# Patient Record
Sex: FEMALE | Race: WHITE | NOT HISPANIC OR LATINO | Employment: OTHER | URBAN - METROPOLITAN AREA
[De-identification: names, ages, dates, MRNs, and addresses within clinical notes are randomized per-mention and may not be internally consistent; named-entity substitution may affect disease eponyms.]

---

## 2023-09-08 PROBLEM — M47.894 THORACIC FACET SYNDROME: Status: ACTIVE | Noted: 2023-09-08

## 2023-09-08 PROBLEM — D64.9 ANEMIA: Status: ACTIVE | Noted: 2023-09-08

## 2023-09-08 PROBLEM — S22.060A COMPRESSION FRACTURE OF T7 VERTEBRA (MULTI): Status: ACTIVE | Noted: 2023-09-08

## 2023-09-08 PROBLEM — K22.70 BARRETT ESOPHAGUS: Status: ACTIVE | Noted: 2023-09-08

## 2023-09-08 PROBLEM — M81.0 OSTEOPOROSIS, SENILE: Status: ACTIVE | Noted: 2023-09-08

## 2023-09-08 PROBLEM — I10 ESSENTIAL HYPERTENSION: Status: ACTIVE | Noted: 2023-09-08

## 2023-09-08 PROBLEM — K21.9 ESOPHAGEAL REFLUX: Status: ACTIVE | Noted: 2023-09-08

## 2023-09-08 PROBLEM — N18.30 CHRONIC KIDNEY DISEASE, STAGE 3 (MULTI): Status: ACTIVE | Noted: 2023-09-08

## 2023-09-08 PROBLEM — I25.10 ATHEROSCLEROTIC HEART DISEASE OF NATIVE CORONARY ARTERY WITHOUT ANGINA PECTORIS: Status: ACTIVE | Noted: 2023-09-08

## 2023-09-08 PROBLEM — S22.009A CLOSED FRACTURE OF THORACIC VERTEBRA (MULTI): Status: ACTIVE | Noted: 2023-09-08

## 2023-09-08 PROBLEM — N18.9 ANEMIA OF CHRONIC RENAL FAILURE: Status: ACTIVE | Noted: 2023-09-08

## 2023-09-08 PROBLEM — R79.9 ABNORMAL BLOOD CHEMISTRY: Status: ACTIVE | Noted: 2023-09-08

## 2023-09-08 PROBLEM — G89.29 OTHER CHRONIC PAIN: Status: ACTIVE | Noted: 2023-09-08

## 2023-09-08 PROBLEM — E78.5 HYPERLIPIDEMIA: Status: ACTIVE | Noted: 2023-09-08

## 2023-09-08 PROBLEM — E78.00 PURE HYPERCHOLESTEROLEMIA: Status: ACTIVE | Noted: 2023-09-08

## 2023-09-08 PROBLEM — E03.9 HYPOTHYROID: Status: ACTIVE | Noted: 2023-09-08

## 2023-09-08 PROBLEM — M54.14 THORACIC RADICULITIS: Status: ACTIVE | Noted: 2023-09-08

## 2023-09-08 PROBLEM — E78.5 DYSLIPIDEMIA: Status: ACTIVE | Noted: 2023-09-08

## 2023-09-08 PROBLEM — G89.29 CHRONIC BACK PAIN: Status: ACTIVE | Noted: 2023-09-08

## 2023-09-08 PROBLEM — M26.609 TEMPOROMANDIBULAR JOINT SYNDROME: Status: ACTIVE | Noted: 2023-09-08

## 2023-09-08 PROBLEM — D63.1 ANEMIA OF CHRONIC RENAL FAILURE: Status: ACTIVE | Noted: 2023-09-08

## 2023-09-08 PROBLEM — H26.9 CATARACT: Status: ACTIVE | Noted: 2023-09-08

## 2023-09-08 PROBLEM — S22.061A: Status: ACTIVE | Noted: 2023-09-08

## 2023-09-08 PROBLEM — E55.9 VITAMIN D DEFICIENCY: Status: ACTIVE | Noted: 2023-09-08

## 2023-09-08 PROBLEM — I82.90 VENOUS THROMBOSIS: Status: ACTIVE | Noted: 2023-09-08

## 2023-09-08 PROBLEM — M05.9 RHEUMATOID ARTHRITIS WITH RHEUMATOID FACTOR (MULTI): Status: ACTIVE | Noted: 2023-09-08

## 2023-09-08 PROBLEM — R41.82 ALTERED MENTAL STATUS: Status: ACTIVE | Noted: 2023-09-08

## 2023-09-08 PROBLEM — M54.9 CHRONIC BACK PAIN: Status: ACTIVE | Noted: 2023-09-08

## 2023-09-08 PROBLEM — M19.90 OSTEOARTHRITIS: Status: ACTIVE | Noted: 2023-09-08

## 2023-09-08 PROBLEM — I51.9 HEART DISEASE: Status: ACTIVE | Noted: 2023-09-08

## 2023-09-08 PROBLEM — G70.00 MYASTHENIA GRAVIS (MULTI): Status: ACTIVE | Noted: 2023-09-08

## 2023-09-08 PROBLEM — R50.9 PYREXIA OF UNKNOWN ORIGIN: Status: ACTIVE | Noted: 2023-09-08

## 2023-09-08 PROBLEM — A41.9 SEPSIS (MULTI): Status: ACTIVE | Noted: 2023-09-08

## 2023-09-08 PROBLEM — R07.9 CHEST PAIN: Status: ACTIVE | Noted: 2023-09-08

## 2023-09-08 PROBLEM — M54.16 LUMBAR RADICULITIS: Status: ACTIVE | Noted: 2023-09-08

## 2023-09-08 PROBLEM — I95.1 ORTHOSTATIC HYPOTENSION: Status: ACTIVE | Noted: 2023-09-08

## 2023-09-08 PROBLEM — N18.4 STAGE 4 CHRONIC KIDNEY DISEASE (MULTI): Status: ACTIVE | Noted: 2023-09-08

## 2023-09-08 PROBLEM — D50.9 IRON DEFICIENCY ANEMIA: Status: ACTIVE | Noted: 2023-09-08

## 2023-09-08 PROBLEM — G47.00 INSOMNIA: Status: ACTIVE | Noted: 2023-09-08

## 2023-09-08 PROBLEM — M76.899 ENTHESOPATHY OF HIP REGION: Status: ACTIVE | Noted: 2023-09-08

## 2023-09-08 PROBLEM — Z95.5 HISTORY OF CORONARY ARTERY STENT PLACEMENT: Status: ACTIVE | Noted: 2023-09-08

## 2023-09-08 RX ORDER — LANOLIN ALCOHOL/MO/W.PET/CERES
1 CREAM (GRAM) TOPICAL DAILY
COMMUNITY
Start: 2018-10-29

## 2023-09-08 RX ORDER — LEVOTHYROXINE SODIUM 75 UG/1
1 TABLET ORAL
COMMUNITY
Start: 2018-10-29 | End: 2024-01-11

## 2023-09-08 RX ORDER — ACETAMINOPHEN 500 MG
1 TABLET ORAL DAILY
COMMUNITY
End: 2024-01-29 | Stop reason: ALTCHOICE

## 2023-09-08 RX ORDER — FOLIC ACID 1 MG/1
1 TABLET ORAL EVERY MORNING
COMMUNITY
End: 2024-01-29 | Stop reason: ALTCHOICE

## 2023-09-08 RX ORDER — PANTOPRAZOLE SODIUM 40 MG/1
1 TABLET, DELAYED RELEASE ORAL DAILY
COMMUNITY
Start: 2017-08-08

## 2023-09-08 RX ORDER — PYRIDOSTIGMINE BROMIDE 60 MG/1
TABLET ORAL
COMMUNITY
Start: 2022-10-24 | End: 2024-01-29 | Stop reason: ALTCHOICE

## 2023-09-08 RX ORDER — LEVOTHYROXINE SODIUM 50 UG/1
1 TABLET ORAL
COMMUNITY
End: 2024-01-29 | Stop reason: ALTCHOICE

## 2023-09-08 RX ORDER — METOPROLOL TARTRATE 50 MG/1
1 TABLET ORAL
COMMUNITY

## 2023-09-08 RX ORDER — AMLODIPINE BESYLATE 10 MG/1
1 TABLET ORAL DAILY
COMMUNITY
End: 2024-01-29 | Stop reason: ALTCHOICE

## 2023-09-08 RX ORDER — ATORVASTATIN CALCIUM 80 MG/1
1 TABLET, FILM COATED ORAL DAILY
COMMUNITY
Start: 2016-06-15

## 2023-09-08 RX ORDER — DOCUSATE SODIUM 100 MG/1
12 CAPSULE, LIQUID FILLED ORAL 2 TIMES DAILY PRN
COMMUNITY
Start: 2016-08-19

## 2023-09-08 RX ORDER — PREDNISONE 5 MG/1
1 TABLET ORAL DAILY
COMMUNITY
End: 2024-01-29 | Stop reason: ALTCHOICE

## 2023-09-08 RX ORDER — DENOSUMAB 60 MG/ML
60 INJECTION SUBCUTANEOUS
COMMUNITY
Start: 2018-03-20

## 2023-09-08 RX ORDER — NITROGLYCERIN 0.4 MG/1
TABLET SUBLINGUAL
COMMUNITY
Start: 2016-06-01

## 2023-09-08 RX ORDER — SODIUM BICARBONATE 650 MG/1
1 TABLET ORAL 2 TIMES DAILY
COMMUNITY
Start: 2018-10-29 | End: 2024-06-10

## 2023-09-08 RX ORDER — SEVELAMER CARBONATE 800 MG/1
1 TABLET, FILM COATED ORAL
COMMUNITY
End: 2024-01-29 | Stop reason: ALTCHOICE

## 2023-09-08 RX ORDER — CALCIUM CITRATE/VITAMIN D3 200MG-6.25
1 TABLET ORAL DAILY
COMMUNITY
Start: 2015-02-19 | End: 2024-01-29 | Stop reason: ALTCHOICE

## 2023-09-08 RX ORDER — AMLODIPINE BESYLATE 2.5 MG/1
1 TABLET ORAL NIGHTLY
COMMUNITY
Start: 2017-05-09 | End: 2023-12-27

## 2023-09-08 RX ORDER — FERROUS SULFATE 325(65) MG
1 TABLET ORAL 3 TIMES WEEKLY
COMMUNITY
Start: 2018-12-21 | End: 2024-01-29 | Stop reason: ALTCHOICE

## 2023-09-08 RX ORDER — HYDROXYCHLOROQUINE SULFATE 200 MG/1
1 TABLET, FILM COATED ORAL 2 TIMES DAILY
COMMUNITY
Start: 2018-10-29 | End: 2024-02-05 | Stop reason: SDUPTHER

## 2023-09-08 RX ORDER — TRAZODONE HYDROCHLORIDE 50 MG/1
0.5 TABLET ORAL NIGHTLY PRN
COMMUNITY
Start: 2021-06-08 | End: 2024-01-29 | Stop reason: ALTCHOICE

## 2023-09-08 RX ORDER — OMEPRAZOLE 40 MG/1
1 CAPSULE, DELAYED RELEASE ORAL
COMMUNITY

## 2023-09-08 RX ORDER — ACETAMINOPHEN 325 MG/1
2 TABLET ORAL
COMMUNITY

## 2023-09-08 RX ORDER — TRAMADOL HYDROCHLORIDE 50 MG/1
1 TABLET ORAL DAILY PRN
COMMUNITY
Start: 2015-09-30 | End: 2024-02-05 | Stop reason: ALTCHOICE

## 2023-09-08 RX ORDER — DICLOFENAC EPOLAMINE 0.01 G/1
SYSTEM TOPICAL 2 TIMES DAILY
COMMUNITY
Start: 2020-06-04

## 2023-11-02 ENCOUNTER — HOSPITAL ENCOUNTER (OUTPATIENT)
Dept: RADIOLOGY | Facility: HOSPITAL | Age: 83
Discharge: HOME | End: 2023-11-02
Payer: MEDICARE

## 2023-11-02 VITALS — WEIGHT: 138 LBS | HEIGHT: 63 IN | BODY MASS INDEX: 24.45 KG/M2

## 2023-11-02 DIAGNOSIS — Z12.31 ENCOUNTER FOR SCREENING MAMMOGRAM FOR MALIGNANT NEOPLASM OF BREAST: ICD-10-CM

## 2023-11-02 PROCEDURE — 77067 SCR MAMMO BI INCL CAD: CPT

## 2023-11-02 PROCEDURE — 77063 BREAST TOMOSYNTHESIS BI: CPT

## 2023-11-17 ENCOUNTER — LAB (OUTPATIENT)
Dept: LAB | Facility: LAB | Age: 83
End: 2023-11-17
Payer: MEDICARE

## 2023-11-17 DIAGNOSIS — I25.10 ATHEROSCLEROTIC HEART DISEASE OF NATIVE CORONARY ARTERY WITHOUT ANGINA PECTORIS: ICD-10-CM

## 2023-11-17 DIAGNOSIS — N18.4 CHRONIC KIDNEY DISEASE, STAGE 4 (SEVERE) (MULTI): Primary | ICD-10-CM

## 2023-11-17 LAB
ALBUMIN SERPL-MCNC: 4.3 G/DL (ref 3.5–5)
ALP BLD-CCNC: 58 U/L (ref 35–125)
ALT SERPL-CCNC: 9 U/L (ref 5–40)
ANION GAP SERPL CALC-SCNC: 14 MMOL/L
AST SERPL-CCNC: 17 U/L (ref 5–40)
BILIRUB SERPL-MCNC: 0.5 MG/DL (ref 0.1–1.2)
BUN SERPL-MCNC: 37 MG/DL (ref 8–25)
CALCIUM SERPL-MCNC: 9.5 MG/DL (ref 8.5–10.4)
CHLORIDE SERPL-SCNC: 101 MMOL/L (ref 97–107)
CHOLEST SERPL-MCNC: 144 MG/DL (ref 133–200)
CHOLEST/HDLC SERPL: 2 {RATIO}
CO2 SERPL-SCNC: 25 MMOL/L (ref 24–31)
CREAT SERPL-MCNC: 2.2 MG/DL (ref 0.4–1.6)
ERYTHROCYTE [DISTWIDTH] IN BLOOD BY AUTOMATED COUNT: 13.1 % (ref 11.5–14.5)
GFR SERPL CREATININE-BSD FRML MDRD: 22 ML/MIN/1.73M*2
GLUCOSE SERPL-MCNC: 100 MG/DL (ref 65–99)
HCT VFR BLD AUTO: 34.8 % (ref 36–46)
HDLC SERPL-MCNC: 72 MG/DL
HGB BLD-MCNC: 11.5 G/DL (ref 12–16)
LDLC SERPL CALC-MCNC: 57 MG/DL (ref 65–130)
MCH RBC QN AUTO: 28.6 PG (ref 26–34)
MCHC RBC AUTO-ENTMCNC: 33 G/DL (ref 32–36)
MCV RBC AUTO: 87 FL (ref 80–100)
NRBC BLD-RTO: 0 /100 WBCS (ref 0–0)
PHOSPHATE SERPL-MCNC: 5.9 MG/DL (ref 2.5–4.5)
PLATELET # BLD AUTO: 286 X10*3/UL (ref 150–450)
POTASSIUM SERPL-SCNC: 4.7 MMOL/L (ref 3.4–5.1)
PROT SERPL-MCNC: 6.7 G/DL (ref 5.9–7.9)
RBC # BLD AUTO: 4.02 X10*6/UL (ref 4–5.2)
SODIUM SERPL-SCNC: 140 MMOL/L (ref 133–145)
TRIGL SERPL-MCNC: 77 MG/DL (ref 40–150)
WBC # BLD AUTO: 5.8 X10*3/UL (ref 4.4–11.3)

## 2023-11-17 PROCEDURE — 80061 LIPID PANEL: CPT

## 2023-11-17 PROCEDURE — 80053 COMPREHEN METABOLIC PANEL: CPT

## 2023-11-17 PROCEDURE — 85027 COMPLETE CBC AUTOMATED: CPT

## 2023-11-17 PROCEDURE — 84100 ASSAY OF PHOSPHORUS: CPT

## 2023-11-17 PROCEDURE — 83970 ASSAY OF PARATHORMONE: CPT

## 2023-11-17 PROCEDURE — 36415 COLL VENOUS BLD VENIPUNCTURE: CPT

## 2023-11-18 LAB — PTH-INTACT SERPL-MCNC: 88.9 PG/ML (ref 18.5–88)

## 2023-12-22 ENCOUNTER — TELEPHONE (OUTPATIENT)
Dept: PRIMARY CARE | Facility: CLINIC | Age: 83
End: 2023-12-22
Payer: MEDICARE

## 2023-12-22 DIAGNOSIS — H60.502 ACUTE OTITIS EXTERNA OF LEFT EAR, UNSPECIFIED TYPE: Primary | ICD-10-CM

## 2023-12-22 RX ORDER — NEOMYCIN SULFATE, POLYMYXIN B SULFATE, HYDROCORTISONE 3.5; 10000; 1 MG/ML; [USP'U]/ML; MG/ML
4 SOLUTION/ DROPS AURICULAR (OTIC) 4 TIMES DAILY
Qty: 10 ML | Refills: 0 | Status: SHIPPED | OUTPATIENT
Start: 2023-12-22 | End: 2024-01-01

## 2023-12-22 NOTE — TELEPHONE ENCOUNTER
Pt c/o left ear pain x2 days and asking if you could send Rx.  States she had this in the past and you Rxd polymyxin drop.  Asking if you could same.  Please advise.      [id number]

## 2023-12-27 DIAGNOSIS — I10 ESSENTIAL HYPERTENSION: ICD-10-CM

## 2023-12-27 RX ORDER — AMLODIPINE BESYLATE 2.5 MG/1
2.5 TABLET ORAL NIGHTLY
Qty: 90 TABLET | Refills: 3 | Status: SHIPPED | OUTPATIENT
Start: 2023-12-27

## 2024-01-11 ENCOUNTER — HOSPITAL ENCOUNTER (OUTPATIENT)
Dept: RADIOLOGY | Facility: HOSPITAL | Age: 84
Discharge: HOME | End: 2024-01-11
Payer: MEDICARE

## 2024-01-11 DIAGNOSIS — S83.231A COMPLEX TEAR OF MEDIAL MENISCUS, CURRENT INJURY, RIGHT KNEE, INITIAL ENCOUNTER: ICD-10-CM

## 2024-01-11 DIAGNOSIS — E03.9 HYPOTHYROIDISM, UNSPECIFIED: ICD-10-CM

## 2024-01-11 PROCEDURE — 73721 MRI JNT OF LWR EXTRE W/O DYE: CPT | Mod: RT

## 2024-01-11 RX ORDER — LEVOTHYROXINE SODIUM 75 UG/1
TABLET ORAL
Qty: 78 TABLET | Refills: 2 | Status: SHIPPED | OUTPATIENT
Start: 2024-01-11

## 2024-01-29 ENCOUNTER — LAB (OUTPATIENT)
Dept: LAB | Facility: LAB | Age: 84
End: 2024-01-29
Payer: MEDICARE

## 2024-01-29 ENCOUNTER — OFFICE VISIT (OUTPATIENT)
Dept: PRIMARY CARE | Facility: CLINIC | Age: 84
End: 2024-01-29
Payer: MEDICARE

## 2024-01-29 VITALS
HEIGHT: 63 IN | SYSTOLIC BLOOD PRESSURE: 102 MMHG | HEART RATE: 60 BPM | TEMPERATURE: 97.3 F | DIASTOLIC BLOOD PRESSURE: 60 MMHG | OXYGEN SATURATION: 100 % | BODY MASS INDEX: 24.45 KG/M2 | WEIGHT: 138 LBS

## 2024-01-29 DIAGNOSIS — E03.9 ACQUIRED HYPOTHYROIDISM: ICD-10-CM

## 2024-01-29 DIAGNOSIS — E55.9 VITAMIN D DEFICIENCY: ICD-10-CM

## 2024-01-29 DIAGNOSIS — R73.01 IMPAIRED FASTING BLOOD SUGAR: ICD-10-CM

## 2024-01-29 DIAGNOSIS — G14 POST-POLIO SYNDROME (CMS-HCC): ICD-10-CM

## 2024-01-29 DIAGNOSIS — M05.9 RHEUMATOID ARTHRITIS WITH POSITIVE RHEUMATOID FACTOR, INVOLVING UNSPECIFIED SITE (MULTI): ICD-10-CM

## 2024-01-29 DIAGNOSIS — N18.4 ANEMIA OF CHRONIC RENAL FAILURE, STAGE 4 (SEVERE) (MULTI): ICD-10-CM

## 2024-01-29 DIAGNOSIS — Z00.00 ROUTINE GENERAL MEDICAL EXAMINATION AT HEALTH CARE FACILITY: Primary | ICD-10-CM

## 2024-01-29 DIAGNOSIS — N18.4 CHRONIC RENAL DISEASE, STAGE IV (MULTI): ICD-10-CM

## 2024-01-29 DIAGNOSIS — I25.10 ATHEROSCLEROSIS OF NATIVE CORONARY ARTERY OF NATIVE HEART WITHOUT ANGINA PECTORIS: ICD-10-CM

## 2024-01-29 DIAGNOSIS — E78.00 PURE HYPERCHOLESTEROLEMIA: ICD-10-CM

## 2024-01-29 DIAGNOSIS — M26.609 TEMPOROMANDIBULAR JOINT SYNDROME: ICD-10-CM

## 2024-01-29 DIAGNOSIS — I12.9 BENIGN HYPERTENSIVE KIDNEY DISEASE WITH CHRONIC KIDNEY DISEASE STAGE I THROUGH STAGE IV, OR UNSPECIFIED(403.10): ICD-10-CM

## 2024-01-29 DIAGNOSIS — D63.1 ANEMIA OF CHRONIC RENAL FAILURE, STAGE 4 (SEVERE) (MULTI): ICD-10-CM

## 2024-01-29 PROBLEM — M25.579 ARTHRALGIA OF ANKLE: Status: ACTIVE | Noted: 2024-01-29

## 2024-01-29 PROBLEM — A80.9 ACUTE POLIOMYELITIS (HHS-HCC): Status: ACTIVE | Noted: 2024-01-29

## 2024-01-29 PROBLEM — J32.9 SINUSITIS: Status: ACTIVE | Noted: 2024-01-29

## 2024-01-29 PROBLEM — H90.3 SENSORINEURAL HEARING LOSS (SNHL) OF BOTH EARS: Status: ACTIVE | Noted: 2024-01-29

## 2024-01-29 PROBLEM — J30.2 SEASONAL ALLERGIC RHINITIS: Status: ACTIVE | Noted: 2024-01-29

## 2024-01-29 PROBLEM — K59.00 CONSTIPATION: Status: ACTIVE | Noted: 2024-01-29

## 2024-01-29 PROBLEM — H92.02 LEFT EAR PAIN: Status: ACTIVE | Noted: 2024-01-29

## 2024-01-29 PROBLEM — H61.20 IMPACTED CERUMEN: Status: ACTIVE | Noted: 2024-01-29

## 2024-01-29 PROBLEM — Z86.2 HISTORY OF IMMUNE DISORDER: Status: ACTIVE | Noted: 2024-01-29

## 2024-01-29 PROBLEM — S22.000A COMPRESSION FRACTURE OF THORACIC VERTEBRA (MULTI): Status: ACTIVE | Noted: 2017-08-31

## 2024-01-29 PROBLEM — M25.569 KNEE PAIN: Status: ACTIVE | Noted: 2023-07-10

## 2024-01-29 PROBLEM — R42 DIZZINESS AND GIDDINESS: Status: ACTIVE | Noted: 2024-01-29

## 2024-01-29 LAB
25(OH)D3 SERPL-MCNC: 30 NG/ML (ref 31–100)
ALBUMIN SERPL-MCNC: 4.3 G/DL (ref 3.5–5)
ALP BLD-CCNC: 62 U/L (ref 35–125)
ALT SERPL-CCNC: 5 U/L (ref 5–40)
ANION GAP SERPL CALC-SCNC: 11 MMOL/L
AST SERPL-CCNC: 16 U/L (ref 5–40)
BILIRUB SERPL-MCNC: 0.4 MG/DL (ref 0.1–1.2)
BUN SERPL-MCNC: 29 MG/DL (ref 8–25)
CALCIUM SERPL-MCNC: 8.9 MG/DL (ref 8.5–10.4)
CHLORIDE SERPL-SCNC: 105 MMOL/L (ref 97–107)
CO2 SERPL-SCNC: 25 MMOL/L (ref 24–31)
CREAT SERPL-MCNC: 2.1 MG/DL (ref 0.4–1.6)
EGFRCR SERPLBLD CKD-EPI 2021: 23 ML/MIN/1.73M*2
ERYTHROCYTE [DISTWIDTH] IN BLOOD BY AUTOMATED COUNT: 13.5 % (ref 11.5–14.5)
EST. AVERAGE GLUCOSE BLD GHB EST-MCNC: 88 MG/DL
GLUCOSE SERPL-MCNC: 63 MG/DL (ref 65–99)
HBA1C MFR BLD: 4.7 %
HCT VFR BLD AUTO: 33.8 % (ref 36–46)
HGB BLD-MCNC: 10.9 G/DL (ref 12–16)
IRON SATN MFR SERPL: 25 % (ref 12–50)
IRON SERPL-MCNC: 71 UG/DL (ref 30–160)
MCH RBC QN AUTO: 28.2 PG (ref 26–34)
MCHC RBC AUTO-ENTMCNC: 32.2 G/DL (ref 32–36)
MCV RBC AUTO: 88 FL (ref 80–100)
NRBC BLD-RTO: 0 /100 WBCS (ref 0–0)
PLATELET # BLD AUTO: 300 X10*3/UL (ref 150–450)
POTASSIUM SERPL-SCNC: 5.5 MMOL/L (ref 3.4–5.1)
PROT SERPL-MCNC: 6.5 G/DL (ref 5.9–7.9)
RBC # BLD AUTO: 3.86 X10*6/UL (ref 4–5.2)
SODIUM SERPL-SCNC: 141 MMOL/L (ref 133–145)
TIBC SERPL-MCNC: 289 UG/DL (ref 228–428)
TSH SERPL DL<=0.05 MIU/L-ACNC: 0.4 MIU/L (ref 0.27–4.2)
UIBC SERPL-MCNC: 218 UG/DL (ref 110–370)
VIT B12 SERPL-MCNC: >2000 PG/ML (ref 211–946)
WBC # BLD AUTO: 6.1 X10*3/UL (ref 4.4–11.3)

## 2024-01-29 PROCEDURE — 1158F ADVNC CARE PLAN TLK DOCD: CPT | Performed by: INTERNAL MEDICINE

## 2024-01-29 PROCEDURE — 1036F TOBACCO NON-USER: CPT | Performed by: INTERNAL MEDICINE

## 2024-01-29 PROCEDURE — 1125F AMNT PAIN NOTED PAIN PRSNT: CPT | Performed by: INTERNAL MEDICINE

## 2024-01-29 PROCEDURE — 1123F ACP DISCUSS/DSCN MKR DOCD: CPT | Performed by: INTERNAL MEDICINE

## 2024-01-29 PROCEDURE — 84443 ASSAY THYROID STIM HORMONE: CPT

## 2024-01-29 PROCEDURE — 82607 VITAMIN B-12: CPT

## 2024-01-29 PROCEDURE — 1159F MED LIST DOCD IN RCRD: CPT | Performed by: INTERNAL MEDICINE

## 2024-01-29 PROCEDURE — 83550 IRON BINDING TEST: CPT

## 2024-01-29 PROCEDURE — 36415 COLL VENOUS BLD VENIPUNCTURE: CPT

## 2024-01-29 PROCEDURE — 80053 COMPREHEN METABOLIC PANEL: CPT

## 2024-01-29 PROCEDURE — 83540 ASSAY OF IRON: CPT

## 2024-01-29 PROCEDURE — 3074F SYST BP LT 130 MM HG: CPT | Performed by: INTERNAL MEDICINE

## 2024-01-29 PROCEDURE — 85027 COMPLETE CBC AUTOMATED: CPT

## 2024-01-29 PROCEDURE — 3078F DIAST BP <80 MM HG: CPT | Performed by: INTERNAL MEDICINE

## 2024-01-29 PROCEDURE — G0439 PPPS, SUBSEQ VISIT: HCPCS | Performed by: INTERNAL MEDICINE

## 2024-01-29 PROCEDURE — 99215 OFFICE O/P EST HI 40 MIN: CPT | Mod: 27 | Performed by: INTERNAL MEDICINE

## 2024-01-29 PROCEDURE — 82306 VITAMIN D 25 HYDROXY: CPT

## 2024-01-29 PROCEDURE — 83036 HEMOGLOBIN GLYCOSYLATED A1C: CPT

## 2024-01-29 RX ORDER — SEVELAMER CARBONATE 800 MG/1
800 TABLET, FILM COATED ORAL 2 TIMES DAILY
COMMUNITY

## 2024-01-29 ASSESSMENT — ENCOUNTER SYMPTOMS
SHORTNESS OF BREATH: 0
PALPITATIONS: 0
DEPRESSION: 0
OCCASIONAL FEELINGS OF UNSTEADINESS: 0
LOSS OF SENSATION IN FEET: 0

## 2024-01-29 ASSESSMENT — PATIENT HEALTH QUESTIONNAIRE - PHQ9
SUM OF ALL RESPONSES TO PHQ9 QUESTIONS 1 AND 2: 0
1. LITTLE INTEREST OR PLEASURE IN DOING THINGS: NOT AT ALL
2. FEELING DOWN, DEPRESSED OR HOPELESS: NOT AT ALL
2. FEELING DOWN, DEPRESSED OR HOPELESS: NOT AT ALL
1. LITTLE INTEREST OR PLEASURE IN DOING THINGS: NOT AT ALL
SUM OF ALL RESPONSES TO PHQ9 QUESTIONS 1 AND 2: 0

## 2024-01-29 ASSESSMENT — PAIN SCALES - GENERAL: PAINLEVEL: 4

## 2024-01-29 NOTE — PATIENT INSTRUCTIONS
[employer], Living Will papers bring in next visit to scan in chart.      Tdap vaccine at local pharmacies, good idea.    Diagnoses and all orders for this visit:  Routine general medical examination at health care facility  Pure hypercholesterolemia  Rheumatoid arthritis with positive rheumatoid factor, involving unspecified site (CMS/[facility])  Comments:   follows, on Prolia, would recommend DEXA scan she can order.  Chronic renal disease, stage IV (CMS/[facility])  -     Comprehensive Metabolic Panel; Future  Anemia of chronic renal failure, stage 4 (severe) (CMS/[facility])  -     CBC; Future  -     Vitamin B12; Future  -     Iron and TIBC; Future  Post-polio syndrome  Atherosclerosis of native coronary artery of native heart without angina pectoris  Temporomandibular joint syndrome  Comments:  Left MTJ causing ear pain, ears looks good. Over the counter bite guard can help. Follow up with dentist if continues.  Acquired hypothyroidism  -     TSH with reflex to Free T4 if abnormal; Future  Vitamin D deficiency  -     Vitamin D 25-Hydroxy,Total (for eval of Vitamin D levels); Future  Impaired fasting blood sugar  -     Hemoglobin A1C; Future  Benign hypertensive kidney disease with chronic kidney disease stage I through stage IV, or unspecified(403.10)  Comments:  Some fatigue, low BP, let's stop amlodipine. low dose

## 2024-01-29 NOTE — PROGRESS NOTES
[facility]: [facility]  MEDICARE WELLNESS EXAM      Kena Bolaños is a 83 y.o. female that is presenting today for Annual Exam (Hair loss, fatigue/).    Assessment/Plan    Diagnoses and all orders for this visit:  Routine general medical examination at [facility] care facility  Pure hypercholesterolemia  Rheumatoid arthritis with positive rheumatoid factor, involving unspecified site (CMS/[facility])  Comments:   follows, on Prolia, would recommend DEXA scan she can order.  Chronic renal disease, stage IV (CMS/[facility])  -     Comprehensive Metabolic Panel; Future  Anemia of chronic renal failure, stage 4 (severe) (CMS/[facility])  -     CBC; Future  -     Vitamin B12; Future  -     Iron and TIBC; Future  Post-polio syndrome  Atherosclerosis of native coronary artery of native heart without angina pectoris  Temporomandibular joint syndrome  Comments:  Left MTJ causing ear pain, ears looks good. Over the counter bite guard can help. Follow up with dentist if continues.  Acquired hypothyroidism  -     TSH with reflex to Free T4 if abnormal; Future  Vitamin D deficiency  -     Vitamin D 25-Hydroxy,Total (for eval of Vitamin D levels); Future  Impaired fasting blood sugar  -     Hemoglobin A1C; Future  Benign hypertensive kidney disease with chronic kidney disease stage I through stage IV, or unspecified(403.10)  Comments:  Some fatigue, low BP, let's stop amlodipine. low dose  Other orders  -     Follow Up In Primary Care - Established; Future    ADVANCED CARE PLANNING  Advanced Care Planning was discussed with patient:  The patient does not have an advanced care plan on file. The patient does not have an active surrogate decision-maker on file.  Encouraged the patient to confirm that Living Will and Healthcare Power of  (HCPoA) are accurate and up to date.  Encouraged the patient to confirm that our office be provided a copy of any documentation in the event that anything changes.    ACTIVITIES OF  DAILY LIVING  Basic ADLs:  Bathing: Independent, Dressing: Independent, Toileting: Independent, Transferring: Independent, Continence: Independent, Feeding: Independent.    Instrumental ADLs:  Ability to use phone: Independent, Shopping: Independent, Cooking: Independent, House-keeping: Independent, Laundry: Independent, Transportation: Independent, Medication Management: Independent, Finance Management: Independent  .    Subjective   Wellness visit. Over all health status doing well. Diet reviewed, Mediterranean, low sugar diet suggested. No  active depression, or little interest in doing activites or hopelessness. Home safety reviewed, well light, no throw rugs,etc. No falls. Advanced directives filled out at home.  ADL, and instrumental ADL no limits doing well. Vision screen eye exam yearly, hearing screen 6 ft whisper test normal.  No cognitive decline observed. Screening sheet given. Fatigue, BP on low end Echo mild apical hypokinesis . Stop Amlodipine low dose.    Review of Systems   Respiratory:  Negative for shortness of breath.    Cardiovascular:  Negative for chest pain and palpitations.   All other systems reviewed and are negative.    Objective   Vitals:    01/29/24 1127   BP: 102/60   Pulse: 60   Temp: 36.3 °C (97.3 °F)   SpO2: 100%      Body mass index is 24.45 kg/m².  Physical Exam  Constitutional:       General: She is not in acute distress.     Appearance: She is not toxic-appearing.   HENT:      Head: Normocephalic and atraumatic.      Right Ear: Tympanic membrane and ear canal normal.      Left Ear: Tympanic membrane and ear canal normal.      Nose: Nose normal.      Mouth/Throat:      Mouth: Mucous membranes are moist.      Pharynx: Oropharynx is clear.   Eyes:      Extraocular Movements: Extraocular movements intact.      Conjunctiva/sclera: Conjunctivae normal.      Pupils: Pupils are equal, round, and reactive to light.   Cardiovascular:      Rate and Rhythm: Normal rate and regular  "rhythm.      Heart sounds: Normal heart sounds. No murmur heard.  Pulmonary:      Breath sounds: Normal breath sounds.   Abdominal:      General: Bowel sounds are normal. There is no distension.      Palpations: Abdomen is soft. There is no mass.      Tenderness: There is no abdominal tenderness.   Musculoskeletal:         General: No swelling or tenderness. Normal range of motion.      Cervical back: Neck supple. No tenderness.      Right lower leg: No edema.      Left lower leg: No edema.   Skin:     General: Skin is warm and dry.   Neurological:      General: No focal deficit present.      Mental Status: She is oriented to person, place, and time.      Sensory: No sensory deficit.      Motor: No weakness.      Deep Tendon Reflexes: Reflexes normal.     Diagnostic Results   Lab Results   Component Value Date    GLUCOSE 100 (H) 11/17/2023    CALCIUM 9.5 11/17/2023     11/17/2023    K 4.7 11/17/2023    CO2 25 11/17/2023     11/17/2023    BUN 37 (H) 11/17/2023    CREATININE 2.20 (H) 11/17/2023     Lab Results   Component Value Date    ALT 9 11/17/2023    AST 17 11/17/2023    ALKPHOS 58 11/17/2023    BILITOT 0.5 11/17/2023     Lab Results   Component Value Date    WBC 5.8 11/17/2023    HGB 11.5 (L) 11/17/2023    HCT 34.8 (L) 11/17/2023    MCV 87 11/17/2023     11/17/2023     Lab Results   Component Value Date    CHOL 144 11/17/2023    CHOL 126 (L) 01/03/2023    CHOL 126 (L) 06/03/2022     Lab Results   Component Value Date    HDL 72.0 11/17/2023    HDL 68 01/03/2023    HDL 64 06/03/2022     Lab Results   Component Value Date    LDLCALC 57 (L) 11/17/2023    LDLCALC 41 (L) 01/03/2023    LDLCALC 47 (L) 06/03/2022     Lab Results   Component Value Date    TRIG 77 11/17/2023    TRIG 86 01/03/2023    TRIG 75 06/03/2022     No components found for: \"CHOLHDL\"  No results found for: \"HGBA1C\"  Other labs not included in the list above reviewed either before or during this encounter.    History   Past Medical " History:   Diagnosis Date   • Acute poliomyelitis, unspecified 2015    Polio   • Atherosclerotic heart disease of native coronary artery without angina pectoris 2023    Echo EF 50-55 Apical hypokinesis  ./   • Raygoza's esophagus without dysplasia 2015    Raygoza's esophagus   • Chronic renal disease, stage IV (CMS/[facility]) 2023       • Dry mouth    • Post-polio syndrome 2024    Right foot   • Rheumatoid arthritis with rheumatoid factor (CMS/[facility]) 2023    Dr. Dena Soto   • Temporomandibular joint syndrome 2023     Past Surgical History:   Procedure Laterality Date   • APPENDECTOMY     •  SECTION, CLASSIC  2015     Section   • CHOLECYSTECTOMY  2015    Cholecystectomy   • CT GUIDED IMAGING FOR NEEDLE PLACEMENT  2018    CT GUIDED IMAGING FOR NEEDLE PLACEMENT [facility]   • FOOT SURGERY  2015    Foot Surgery right '16 screw Dr.Dillar Rosado     Family History   Problem Relation Name Age of Onset   • Diabetes Mother     • Colon cancer Mother     • Diabetes Maternal Grandmother     • Diabetes Maternal Grandfather     • Pancreatic cancer Sibling       Social History     Socioeconomic History   • Marital status:      Spouse name: Not on file   • Number of children: Not on file   • Years of education: Not on file   • Highest education level: Not on file   Occupational History   • Not on file   Tobacco Use   • Smoking status: Never     Passive exposure: Never   • Smokeless tobacco: Never   Vaping Use   • Vaping Use: Never used   Substance and Sexual Activity   • Alcohol use: Yes   • Drug use: Never   • Sexual activity: Not on file   Other Topics Concern   • Not on file   Social History Narrative   • Not on file     Social Determinants of Health     Financial Resource Strain: Not on file   Food Insecurity: Not on file   Transportation Needs: Not on file   Physical Activity: Not on file   Stress: Not on file    Social Connections: Not on file   Intimate Partner Violence: Not on file   Housing Stability: Not on file     Allergies   Allergen Reactions   • Penicillins Hives   • Sulfa (Sulfonamide Antibiotics) Hives   • Losartan Potassium Other     Renal insuff   • Bee Venom Protein (Honey Bee) Rash     Current Outpatient Medications on File Prior to Visit   Medication Sig Dispense Refill   • acetaminophen (TylenoL) 325 mg tablet Take 2 tablets (650 mg) by mouth. 2-3 times a day     • amLODIPine (Norvasc) 2.5 mg tablet TAKE 1 TABLET BY MOUTH EVERY DAY AT NIGHT 90 tablet 3   • atorvastatin (Lipitor) 80 mg tablet Take 1 tablet (80 mg) by mouth once daily.     • cyanocobalamin (Vitamin B-12) 1,000 mcg tablet Take 1 tablet (1,000 mcg) by mouth once daily. As directed     • denosumab (Prolia) 60 mg/mL syringe Inject 1 mL (60 mg) under the skin every 6 months.     • diclofenac epolamine 1.3 % patch Place on the skin twice a day. Affected area     • docusate sodium (Colace) 100 mg capsule Take 12 capsules (1,200 mg) by mouth 2 times a day as needed.     • hydroxychloroquine (Plaquenil) 200 mg tablet Take 1 tablet (200 mg) by mouth 2 times a day.     • levothyroxine (Synthroid, Levoxyl) 75 mcg tablet TAKE 1 TABLET BY MOUTH IN THE MORNING ON AN EMPTY STOMACH MONDAY-SATURDAY 78 tablet 2   • methyl salicylate/menth/camph (SALONPAS TOP) Place on the skin 2 times a week.     • metoprolol tartrate (Lopressor) 50 mg tablet Take 1 tablet by mouth 2 times a day with meals.     • neomycin/polymyxin B/hydrocort (CORTISPORIN OTIC) 4 drops 2 times a day. Affected ear     • nitroglycerin (Nitrostat) 0.4 mg SL tablet Place under the tongue. PLACE 1 TABLET UNDER THE TONGUE EVERY 5 MINUTES FOR UP TO 3 DOSES AS NEEDED FOR CHEST PAIN.CALL 911 IF PAIN PERSISTS.     • omeprazole (PriLOSEC) 40 mg DR capsule Take 1 capsule (40 mg) by mouth once daily in the evening. Take with meals. gerd     • pantoprazole (ProtoNix) 40 mg EC tablet Take 1 tablet (40 mg)  by mouth once daily.     • POLYETHYLENE GLYCOL 3350 ORAL Take 17 g by mouth once daily.     • sevelamer carbonate (Renvela) 800 mg tablet Take 1 tablet (800 mg) by mouth 2 times a day. Swallow tablet whole; do not crush, break, or chew.     • sodium bicarbonate 650 mg tablet Take 1 tablet (650 mg) by mouth 2 times a day.     • tetrahydroz/dext 70/peg 400/pv (EYE DROPS ADVANCED RELIEF OPHT) Eye Drops Relief     • traMADol (Ultram) 50 mg tablet Take 1 tablet (50 mg) by mouth once daily as needed for severe pain (7 - 10).     • [DISCONTINUED] amLODIPine (Norvasc) 10 mg tablet Take 1 tablet (10 mg) by mouth once daily.     • [DISCONTINUED] kyle cit-mag-D3-Zn--man-bor (Citracal-D3 Plus Magnesium) 250- mg-mg-unit tablet Take 1 tablet by mouth once daily.     • [DISCONTINUED] cholecalciferol (Vitamin D-3) 50 mcg (2,000 unit) capsule Take 1 capsule (50 mcg) by mouth early in the morning..     • [DISCONTINUED] ferrous sulfate 325 (65 Fe) MG tablet Take 1 tablet (325 mg) by mouth 3 times a week.     • [DISCONTINUED] folic acid (Folvite) 1 mg tablet Take 1 tablet (1 mg) by mouth once daily in the morning.     • [DISCONTINUED] hydroxychloroquine sulfate (HYDROXYCHLOROQUINE ORAL) Take 2 tablets by mouth once daily. With food or milk     • [DISCONTINUED] levothyroxine (Synthroid, Levoxyl) 50 mcg tablet Take 1 tablet (50 mcg) by mouth once daily in the morning. Take before meals. 30 min before eating     • [DISCONTINUED] predniSONE (Deltasone) 5 mg tablet Take 1 tablet (5 mg) by mouth once daily.     • [DISCONTINUED] pyridostigmine (Mestinon) 60 mg tablet      • [DISCONTINUED] sevelamer carbonate (Renvela) 800 mg tablet 1 tablet (800 mg) 2 times a day with meals.     • [DISCONTINUED] tetrahydrozoline 0.05 % ophthalmic solution 1 drop 4 times a day as needed. Affected eye     • [DISCONTINUED] traZODone (Desyrel) 50 mg tablet Take 0.5 tablets (25 mg) by mouth as needed at bedtime.       No current facility-administered  medications on file prior to visit.     Immunization History   Administered Date(s) Administered   • Flu vaccine, quadrivalent, high-dose, preservative free, age 65y+ (FLUZONE) 09/21/2021   • Influenza, High Dose Seasonal, Preservative Free 09/26/2017, 09/21/2018   • Influenza, injectable, quadrivalent 10/20/2016, 10/22/2018, 10/13/2020   • Influenza, seasonal, injectable 10/04/2010, 10/25/2011, 10/08/2012, 09/05/2016   • Moderna COVID-19 vaccine, Fall 2023, 12 yeasrs and older (50mcg/0.5mL) 10/02/2023   • Moderna SARS-CoV-2 Vaccination 02/08/2021, 03/05/2021   • Pfizer Gray Cap SARS-CoV-2 07/21/2022   • Pfizer Purple Cap SARS-CoV-2 11/09/2021   • Pneumococcal conjugate vaccine, 13-valent (PREVNAR 13) 07/02/2015   • Pneumococcal polysaccharide vaccine, 23-valent, age 2 years and older (PNEUMOVAX 23) 01/01/2000, 11/30/2011   • Tdap vaccine, age 7 year and older (BOOSTRIX, ADACEL) 06/06/2013   • Zoster vaccine, recombinant, adult (SHINGRIX) 02/01/2019, 06/03/2019     Patient's medical history was reviewed and updated either before or during this encounter.     Donal Dewey MD

## 2024-01-30 ENCOUNTER — TELEPHONE (OUTPATIENT)
Dept: PRIMARY CARE | Facility: CLINIC | Age: 84
End: 2024-01-30
Payer: MEDICARE

## 2024-01-30 NOTE — TELEPHONE ENCOUNTER
Call pt let her know she will need to talk with  office Potassium increase may need additional medication to drive down potassium levels. K 5.5, resin TX? Pt aware

## 2024-02-05 ENCOUNTER — OFFICE VISIT (OUTPATIENT)
Dept: RHEUMATOLOGY | Facility: CLINIC | Age: 84
End: 2024-02-05
Payer: MEDICARE

## 2024-02-05 VITALS — BODY MASS INDEX: 24.27 KG/M2 | DIASTOLIC BLOOD PRESSURE: 70 MMHG | WEIGHT: 137 LBS | SYSTOLIC BLOOD PRESSURE: 126 MMHG

## 2024-02-05 DIAGNOSIS — M81.0 OSTEOPOROSIS, SENILE: ICD-10-CM

## 2024-02-05 DIAGNOSIS — M19.90 OSTEOARTHRITIS, UNSPECIFIED OSTEOARTHRITIS TYPE, UNSPECIFIED SITE: ICD-10-CM

## 2024-02-05 DIAGNOSIS — M05.9 RHEUMATOID ARTHRITIS WITH POSITIVE RHEUMATOID FACTOR, INVOLVING UNSPECIFIED SITE (MULTI): Primary | ICD-10-CM

## 2024-02-05 PROCEDURE — 1125F AMNT PAIN NOTED PAIN PRSNT: CPT | Performed by: INTERNAL MEDICINE

## 2024-02-05 PROCEDURE — 99214 OFFICE O/P EST MOD 30 MIN: CPT | Performed by: INTERNAL MEDICINE

## 2024-02-05 PROCEDURE — 1159F MED LIST DOCD IN RCRD: CPT | Performed by: INTERNAL MEDICINE

## 2024-02-05 PROCEDURE — 3078F DIAST BP <80 MM HG: CPT | Performed by: INTERNAL MEDICINE

## 2024-02-05 PROCEDURE — 3074F SYST BP LT 130 MM HG: CPT | Performed by: INTERNAL MEDICINE

## 2024-02-05 PROCEDURE — 1036F TOBACCO NON-USER: CPT | Performed by: INTERNAL MEDICINE

## 2024-02-05 RX ORDER — ALBUTEROL SULFATE 0.83 MG/ML
3 SOLUTION RESPIRATORY (INHALATION) AS NEEDED
Status: CANCELLED | OUTPATIENT
Start: 2024-02-12

## 2024-02-05 RX ORDER — FAMOTIDINE 10 MG/ML
20 INJECTION INTRAVENOUS ONCE AS NEEDED
Status: CANCELLED | OUTPATIENT
Start: 2024-02-12

## 2024-02-05 RX ORDER — HYDROXYCHLOROQUINE SULFATE 200 MG/1
200 TABLET, FILM COATED ORAL DAILY
Qty: 90 TABLET | Refills: 1 | Status: SHIPPED | OUTPATIENT
Start: 2024-02-05 | End: 2024-04-30 | Stop reason: SDUPTHER

## 2024-02-05 RX ORDER — DIPHENHYDRAMINE HYDROCHLORIDE 50 MG/ML
50 INJECTION INTRAMUSCULAR; INTRAVENOUS AS NEEDED
Status: CANCELLED | OUTPATIENT
Start: 2024-02-12

## 2024-02-05 RX ORDER — EPINEPHRINE 0.3 MG/.3ML
0.3 INJECTION SUBCUTANEOUS EVERY 5 MIN PRN
Status: CANCELLED | OUTPATIENT
Start: 2024-02-12

## 2024-02-05 NOTE — PROGRESS NOTES
"Recheck  OA  /  RA  /  OP  ( prolia next week - [place] )  Doing well.  Abnormal labs recently.       HPI - She saw Dr. Hernandez about R knee pain and had MRI last mo.  He gave her injection?when \"probably 3-4 months ago\" and she said he didn't mention one this time.  Injections only last 1 mo.  Tylenol helps.  Pain is worst at night.  No other pain.  No swelling.   ?AM stiffness \"my hands hurt\"  No CP, resp, or GI.  Sees eye dr. BROWN  NAD  RRR no r/m/g  CTA  No edema  No synovitis  R knee pain with ROM    A/P - OA and RA - stable  Decr hcq to 1 tab daily  Knee pain - ortho following.    OP - prolia is expensive.  She has appt for next week.  Will see if there is any pt assistance available.  Consider calcitonin nasal spray if she is no longer able to afford it  Reviewed recent labs - [facility]  Reeval 6 mo or sooner PRN      "

## 2024-02-09 ENCOUNTER — DOCUMENTATION (OUTPATIENT)
Dept: INFUSION THERAPY | Facility: CLINIC | Age: 84
End: 2024-02-09
Payer: MEDICARE

## 2024-02-09 NOTE — PROGRESS NOTES
Patient to be scheduled for Continuation of Prolia injections.  For Diagnosis: Osteoporosis    Labs..  Calcium drawn on: 9/5/2024 Ca+ 9.4  Lab Results   Component Value Date    CALCIUM 8.9 01/29/2024    PHOS 5.9 (H) 11/17/2023      Lab Results   Component Value Date    CAION 1.10 07/14/2023   Creatinine clearance 18.15 ml/min with Sept 2024 labs- OK per Dr. Fernandes to give injection  (>8.6mg/dl or ionized calcium WNL.  Hold / Contact provider if <8.6) (must be within 28 days of scheduled injection)    Calcium and Vitamin D supplement on medication list? No    Current Outpatient Medications:     acetaminophen (TylenoL) 325 mg tablet, Take 2 tablets (650 mg) by mouth. 2-3 times a day, Disp: , Rfl:     amLODIPine (Norvasc) 2.5 mg tablet, TAKE 1 TABLET BY MOUTH EVERY DAY AT NIGHT, Disp: 90 tablet, Rfl: 3    atorvastatin (Lipitor) 80 mg tablet, Take 1 tablet (80 mg) by mouth once daily., Disp: , Rfl:     cyanocobalamin (Vitamin B-12) 1,000 mcg tablet, Take 1 tablet (1,000 mcg) by mouth once daily. As directed, Disp: , Rfl:     denosumab (Prolia) 60 mg/mL syringe, Inject 1 mL (60 mg) under the skin every 6 months., Disp: , Rfl:     diclofenac epolamine 1.3 % patch, Place on the skin twice a day. Affected area, Disp: , Rfl:     docusate sodium (Colace) 100 mg capsule, Take 12 capsules (1,200 mg) by mouth 2 times a day as needed., Disp: , Rfl:     hydroxychloroquine (Plaquenil) 200 mg tablet, Take 1 tablet (200 mg) by mouth once daily., Disp: 90 tablet, Rfl: 1    levothyroxine (Synthroid, Levoxyl) 75 mcg tablet, TAKE 1 TABLET BY MOUTH IN THE MORNING ON AN EMPTY STOMACH MONDAY-SATURDAY, Disp: 78 tablet, Rfl: 2    methyl salicylate/menth/camph (SALONPAS TOP), Place on the skin 2 times a week., Disp: , Rfl:     metoprolol tartrate (Lopressor) 50 mg tablet, Take 1 tablet by mouth 2 times a day with meals., Disp: , Rfl:     nitroglycerin (Nitrostat) 0.4 mg SL tablet, Place under the tongue. PLACE 1 TABLET UNDER THE TONGUE EVERY  5 MINUTES FOR UP TO 3 DOSES AS NEEDED FOR CHEST PAIN.CALL 911 IF PAIN PERSISTS., Disp: , Rfl:     omeprazole (PriLOSEC) 40 mg DR capsule, Take 1 capsule (40 mg) by mouth once daily in the evening. Take with meals. gerd, Disp: , Rfl:     pantoprazole (ProtoNix) 40 mg EC tablet, Take 1 tablet (40 mg) by mouth once daily., Disp: , Rfl:     POLYETHYLENE GLYCOL 3350 ORAL, Take 17 g by mouth once daily., Disp: , Rfl:     sevelamer carbonate (Renvela) 800 mg tablet, Take 1 tablet (800 mg) by mouth 2 times a day. Swallow tablet whole; do not crush, break, or chew., Disp: , Rfl:     sodium bicarbonate 650 mg tablet, Take 1 tablet (650 mg) by mouth 2 times a day., Disp: , Rfl:     tetrahydroz/dext 70/peg 400/pv (EYE DROPS ADVANCED RELIEF OPHT), Eye Drops Relief, Disp: , Rfl:    (if no nurse to encourage discussion of supplementation at visit)    No obvious recent dental work per chart review. Nurse to confirm no dental within past/next 4 weeks at encounter.  Urine Hcg test ordered?Not applicable (If female pt <60 years of age and with reproductive capability)  (If urine Hcg test ordered please instruct pt upon scheduling to drink 32 ounces of water 1 hour before arrival so bladder is full for needed urine sample)       Scheduled 09/24/24  Meets criteria for prolia. Dr. Fernandes aware of creatinine clearance and states we can administer prolia at this time.

## 2024-02-13 ENCOUNTER — APPOINTMENT (OUTPATIENT)
Dept: INFUSION THERAPY | Facility: CLINIC | Age: 84
End: 2024-02-13
Payer: MEDICARE

## 2024-02-21 ENCOUNTER — LAB (OUTPATIENT)
Dept: LAB | Facility: LAB | Age: 84
End: 2024-02-21
Payer: MEDICARE

## 2024-02-21 DIAGNOSIS — M81.0 AGE-RELATED OSTEOPOROSIS WITHOUT CURRENT PATHOLOGICAL FRACTURE: Primary | ICD-10-CM

## 2024-02-21 LAB
CA-I BLD-SCNC: 1.14 MMOL/L (ref 1.1–1.33)
CREAT SERPL-MCNC: 2.1 MG/DL (ref 0.4–1.6)
EGFRCR SERPLBLD CKD-EPI 2021: 23 ML/MIN/1.73M*2

## 2024-02-21 PROCEDURE — 82330 ASSAY OF CALCIUM: CPT

## 2024-02-21 PROCEDURE — 36415 COLL VENOUS BLD VENIPUNCTURE: CPT

## 2024-02-21 PROCEDURE — 82565 ASSAY OF CREATININE: CPT

## 2024-02-26 DIAGNOSIS — M81.0 OSTEOPOROSIS, SENILE: Primary | ICD-10-CM

## 2024-02-27 ENCOUNTER — INFUSION (OUTPATIENT)
Dept: INFUSION THERAPY | Facility: CLINIC | Age: 84
End: 2024-02-27
Payer: MEDICARE

## 2024-02-27 VITALS
TEMPERATURE: 97.5 F | BODY MASS INDEX: 24.45 KG/M2 | RESPIRATION RATE: 16 BRPM | HEART RATE: 82 BPM | OXYGEN SATURATION: 99 % | DIASTOLIC BLOOD PRESSURE: 77 MMHG | SYSTOLIC BLOOD PRESSURE: 172 MMHG | WEIGHT: 138 LBS

## 2024-02-27 DIAGNOSIS — M81.0 OSTEOPOROSIS, SENILE: ICD-10-CM

## 2024-02-27 PROCEDURE — 96372 THER/PROPH/DIAG INJ SC/IM: CPT | Performed by: NURSE PRACTITIONER

## 2024-02-27 RX ORDER — DIPHENHYDRAMINE HYDROCHLORIDE 50 MG/ML
50 INJECTION INTRAMUSCULAR; INTRAVENOUS AS NEEDED
OUTPATIENT
Start: 2024-08-25

## 2024-02-27 RX ORDER — FAMOTIDINE 10 MG/ML
20 INJECTION INTRAVENOUS ONCE AS NEEDED
OUTPATIENT
Start: 2024-08-25

## 2024-02-27 RX ORDER — ALBUTEROL SULFATE 0.83 MG/ML
3 SOLUTION RESPIRATORY (INHALATION) AS NEEDED
OUTPATIENT
Start: 2024-08-25

## 2024-02-27 RX ORDER — EPINEPHRINE 0.3 MG/.3ML
0.3 INJECTION SUBCUTANEOUS EVERY 5 MIN PRN
OUTPATIENT
Start: 2024-08-25

## 2024-02-27 ASSESSMENT — PAIN SCALES - GENERAL: PAINLEVEL: 0-NO PAIN

## 2024-02-27 NOTE — PROGRESS NOTES
[facility]   infusion Clinic Note   Date: 2024   Name: Kena Bolaños  : 1940   MRN: [id number]         Reason for Visit: Injections (Prolia)         Visit Type: INJECTION       Ordered By: Dr Fernandes      Accompanied by:Self      Diagnosis: Osteoporosis, senile       Allergies:   Allergies as of 2024 - Reviewed 2024   Allergen Reaction Noted    Penicillins Hives 2023    Sulfa (sulfonamide antibiotics) Hives 2023    Losartan potassium Other 2023    Bee venom protein (honey bee) Rash 2023         Current Medications has a current medication list which includes the following prescription(s): acetaminophen, amlodipine, atorvastatin, cyanocobalamin, prolia, denosumab, diclofenac epolamine, docusate sodium, hydroxychloroquine, levothyroxine, methyl salicylate/menth/camph, metoprolol tartrate, nitroglycerin, omeprazole, pantoprazole, polyethylene glycol 3350, sevelamer carbonate, sodium bicarbonate, and tetrahydroz/dext 70/peg 400/pv.       Vitals:   Vitals:    24 1142   BP: 172/77   Pulse: 82   Resp: 16   Temp: 36.4 °C (97.5 °F)   TempSrc: Temporal   SpO2: 99%   Weight: 62.6 kg (138 lb)   PainSc: 0-No pain             Infusion Pre-procedure Checklist:   - Allergies reviewed: yes   - Medications reviewed: yes       - Previous reaction to current treatment: no      Assess patient for the concerns below. Document provider notification as appropriate.  - Active or recent infection with/without current antibiotic use: no  - Recent or planned invasive dental work: no  - Recent or planned surgeries: no  - Recently received or plans to receive vaccinations: no  - Has treatment related toxicities: no  - Is pregnant:  n/a      Pain: 0   - Is the pain different from normal: n/a   - Is the pain tolerable: n/a   - Is your Doctor aware:  n/a      Labs: N/A         Fall Risk Screening: John Fall Risk  History of Falling, Immediate or Within 3 Months:  No  Secondary Diagnosis: No  Ambulatory Aid: Walks without aid/bedrest/nurse assist  Intravenous Therapy/Heparin Lock: No  Gait/Transferring: Normal/bedrest/immobile  Mental Status: Oriented to own ability  Lopez Fall Risk Score: 0       Review Of Systems:  Review of Systems   All other systems reviewed and are negative.        Infusion Readiness:   - Assessment Concerns Related to Infusion: No  - Provider notified: n/a      Document Below Only If Indicated:   New Patient Education:    N/A (returning patient for continuation of therapy. Ongoing education provided as needed.)        Treatment Conditions & Drug Specific Questions:    Denosumab  (PROLIA. XGEVA)    (Unless otherwise specified on patient specific therapy plan):     TREATMENT CONDITIONS:  Unless otherwise specified on patient specific therapy plan HOLD and notify provider prior to proceeding with today's injection if patients:  o Calcium is LESS THAN 8.6 mg/dL OR  Ionized Calcium LESS THAN 1.1 mmol/L  o Recent or planned invasive dental procedure (within 4 weeks)    Lab Results   Component Value Date    CALCIUM 8.9 01/29/2024    PHOS 5.9 (H) 11/17/2023      Lab Results   Component Value Date    CAION 1.14 02/21/2024       Labs reviewed and patient meets treatment conditions? Yes, per Dr Dena HARRIS to treat with current lab values    DRUG SPECIFIC QUESTIONS:  Is the patient taking calcium and vitamin D? Yes  (Recommended)    Pt Instructed on following risks: (1) hypocalcemia, (2) osteonecrosis of the jaw, (3) atypical femoral fractures, (4) serious infections, and (5) dermatologic reactions?  Yes      REMINDER:  PREGNANCY CATEGORY X DRUG. OBTAIN NEGTATIVE PREGNANCY TEST PRIOR TO FIRST INFUSION FOR WOMEN OF CHILDBEARING ABILITY   REMS DRUG    Recommended Vitals/Observation:  Vitals: Obtain vitals prior to injection.  Observation: Patient may leave immediately following injection.        Weight Based Drug Calculations:    WEIGHT BASED DRUGS: NOT APPLICABLE /  FLAT DOSE          Initiated By: May Dacosta RN   Time: 12:11 PM     We administered denosumab.     **Documentation taking place after administration/visit due to connectivity issues**

## 2024-02-27 NOTE — PATIENT INSTRUCTIONS
Today :We administered denosumab.     For:   1. Osteoporosis, senile         Your next appointment is due in:  6 months        Please read the  Medication Guide that was given to you and reviewed during todays visit.     (Tell all doctors including dentists that you are taking this medication)     Go to the emergency room or call 911 if:  -You have signs of allergic reaction:   -Rash, hives, itching.   -Swollen, blistered, peeling skin.   -Swelling of face, lips, mouth, tongue or throat.   -Tightness of chest, trouble breathing, swallowing or talking     Call your doctor:  - If IV / injection site gets red, warm, swollen, itchy or leaks fluid or pus.     (Leave dressing on your IV site for at least 2 hours and keep area clean and dry  - If you get sick or have symptoms of infection or are not feeling well for any reason.    (Wash your hands often, stay away from people who are sick)  - If you have side effects from your medication that do not go away or are bothersome.     (Refer to the teaching your nurse gave you for side effects to call your doctor about)    - Common side effects may include:  stuffy nose, headache, feeling tired, muscle aches, upset stomach  - Before receiving any vaccines     - Call the [facility] at   If:  - You get sick, are on antibiotics, have had a recent vaccine, have surgery or dental work and your doctor wants your visit rescheduled.  - You need to cancel and reschedule your visit for any reason. Call at least 2 days before your visit if you need to cancel.   - Your insurance changes before your next visit.    (We will need to get approval from your new insurance. This can take up to two weeks.)     The [facility] is opened Monday thru Friday. We are closed on weekends and holidays.   Voice mail will take your call if the center is closed. If you leave a message please allow 24 hours for a call back during weekdays. If you leave a message on a  weekend/holiday, we will call you back the next business day.

## 2024-03-13 ENCOUNTER — LAB (OUTPATIENT)
Dept: LAB | Facility: LAB | Age: 84
End: 2024-03-13
Payer: MEDICARE

## 2024-03-13 DIAGNOSIS — N18.4 CHRONIC KIDNEY DISEASE, STAGE 4 (SEVERE) (MULTI): Primary | ICD-10-CM

## 2024-03-13 LAB
ALBUMIN SERPL-MCNC: 4.3 G/DL (ref 3.5–5)
ANION GAP SERPL CALC-SCNC: 13 MMOL/L
BUN SERPL-MCNC: 27 MG/DL (ref 8–25)
CALCIUM SERPL-MCNC: 8.7 MG/DL (ref 8.5–10.4)
CHLORIDE SERPL-SCNC: 106 MMOL/L (ref 97–107)
CO2 SERPL-SCNC: 21 MMOL/L (ref 24–31)
CREAT SERPL-MCNC: 1.9 MG/DL (ref 0.4–1.6)
EGFRCR SERPLBLD CKD-EPI 2021: 26 ML/MIN/1.73M*2
ERYTHROCYTE [DISTWIDTH] IN BLOOD BY AUTOMATED COUNT: 14 % (ref 11.5–14.5)
GLUCOSE SERPL-MCNC: 99 MG/DL (ref 65–99)
HCT VFR BLD AUTO: 34.2 % (ref 36–46)
HGB BLD-MCNC: 10.9 G/DL (ref 12–16)
MCH RBC QN AUTO: 28.1 PG (ref 26–34)
MCHC RBC AUTO-ENTMCNC: 31.9 G/DL (ref 32–36)
MCV RBC AUTO: 88 FL (ref 80–100)
NRBC BLD-RTO: 0 /100 WBCS (ref 0–0)
PHOSPHATE SERPL-MCNC: 4.1 MG/DL (ref 2.5–4.5)
PLATELET # BLD AUTO: 271 X10*3/UL (ref 150–450)
POTASSIUM SERPL-SCNC: 4.6 MMOL/L (ref 3.4–5.1)
PTH-INTACT SERPL-MCNC: 264.6 PG/ML (ref 18.5–88)
RBC # BLD AUTO: 3.88 X10*6/UL (ref 4–5.2)
SODIUM SERPL-SCNC: 140 MMOL/L (ref 133–145)
WBC # BLD AUTO: 5.1 X10*3/UL (ref 4.4–11.3)

## 2024-03-13 PROCEDURE — 83970 ASSAY OF PARATHORMONE: CPT

## 2024-03-13 PROCEDURE — 36415 COLL VENOUS BLD VENIPUNCTURE: CPT

## 2024-03-13 PROCEDURE — 85027 COMPLETE CBC AUTOMATED: CPT

## 2024-03-13 PROCEDURE — 80069 RENAL FUNCTION PANEL: CPT

## 2024-04-08 ENCOUNTER — TELEPHONE (OUTPATIENT)
Dept: PRIMARY CARE | Facility: CLINIC | Age: 84
End: 2024-04-08
Payer: MEDICARE

## 2024-04-08 DIAGNOSIS — R05.1 ACUTE COUGH: Primary | ICD-10-CM

## 2024-04-08 RX ORDER — BENZONATATE 100 MG/1
100 CAPSULE ORAL 2 TIMES DAILY PRN
Qty: 20 CAPSULE | Refills: 0 | Status: SHIPPED | OUTPATIENT
Start: 2024-04-08 | End: 2024-04-18

## 2024-04-08 NOTE — TELEPHONE ENCOUNTER
Patient made aware  
Pt states she has a dry cough, scratchy throat, dripping in the back of the throat, no fever x 3 days,  covid test yesterday was negative, hasn't tried anything over the counter, please advice    [phone]    [facility] [address] [facility] [place]      
Tessalon perles 100 mg BIDprn cough OK to try  
moderate assist (50% patients effort)

## 2024-04-30 DIAGNOSIS — M05.9 RHEUMATOID ARTHRITIS WITH POSITIVE RHEUMATOID FACTOR, INVOLVING UNSPECIFIED SITE (MULTI): ICD-10-CM

## 2024-04-30 RX ORDER — HYDROXYCHLOROQUINE SULFATE 200 MG/1
200 TABLET, FILM COATED ORAL DAILY
Qty: 90 TABLET | Refills: 0 | Status: SHIPPED | OUTPATIENT
Start: 2024-04-30

## 2024-05-06 ENCOUNTER — APPOINTMENT (OUTPATIENT)
Dept: DERMATOLOGY | Facility: CLINIC | Age: 84
End: 2024-05-06
Payer: MEDICARE

## 2024-05-20 ENCOUNTER — PROCEDURE VISIT (OUTPATIENT)
Dept: DERMATOLOGY | Facility: CLINIC | Age: 84
End: 2024-05-20
Payer: MEDICARE

## 2024-05-20 DIAGNOSIS — C44.41 BASAL CELL CARCINOMA (BCC) OF SCALP: ICD-10-CM

## 2024-05-20 DIAGNOSIS — C44.42 SQUAMOUS CELL CARCINOMA OF SCALP: Primary | ICD-10-CM

## 2024-05-20 PROCEDURE — 17311 MOHS 1 STAGE H/N/HF/G: CPT | Performed by: DERMATOLOGY

## 2024-05-20 PROCEDURE — 99204 OFFICE O/P NEW MOD 45 MIN: CPT | Performed by: DERMATOLOGY

## 2024-05-20 PROCEDURE — 17312 MOHS ADDL STAGE: CPT | Performed by: DERMATOLOGY

## 2024-05-20 PROCEDURE — 13121 CMPLX RPR S/A/L 2.6-7.5 CM: CPT | Performed by: DERMATOLOGY

## 2024-05-20 NOTE — PROGRESS NOTES
Mohs Surgery Operative Note    Date of Surgery:  5/20/2024  Surgeon:  Fabiola Martinez MD  Office Location:  [address] [facility]  [address]  [address]  [facility] [address]  Dept: [phone]  Dept Fax: [phone]  Referring Provider: Julia Barreto DO  [address], [address]  [place] [address]  Phone: [phone] Fax [phone]       Assessment/Plan   Pre-procedure:   Obtained informed consent: written from patient  The surgical site was identified and confirmed with the patient.     Intra-operative:   Audible time out called at : 9:48AM 05/20/24  by: Maria Victoria Victoria LPN  Verified patient name, birthdate, site, specimen bottle label & requisition.    The planned procedure(s) was again reviewed with the patient. The risks of bleeding, infection, nerve damage and scarring were reviewed. Written authorization was obtained. The patient identity, surgical site, and planned procedure(s) were verified. The provider acted as both surgeon and pathologist.     Squamous cell carcinoma of scalp  Right Central Frontal Scalp  Mohs surgery  Consent obtained: written    Universal Protocol:  Procedure explained and questions answered to patient or proxy's satisfaction: Yes    Test results available and properly labeled: Yes    Pathology report reviewed: Yes    External notes reviewed: Yes    Photo or diagram used for site identification: Yes    Site/side marked: Yes    Slide independently reviewed by Mohs surgeon: Yes    Immediately prior to procedure a time out was called: Yes    Patient identity confirmed: verbally with patient  Preparation: Patient was prepped and draped in usual sterile fashion      Anticoagulation:  Is the patient taking prescription anticoagulant and/or aspirin prescribed/recommended by a physician? Yes    Was the anticoagulation regimen changed prior to Mohs? No      Anesthesia:  Anesthesia method: local infiltration  Local anesthetic: 0.5% Lidocaine with  Epinephrine.    Procedure Details:  Biopsy accession number: [id number]  Date of biopsy: 4/18/2024  Pre-Op diagnosis: squamous cell carcinoma  Post-Op diagnosis: squamous cell carcinoma and basal cell carcinoma  Surgery side: right  Surgical site (from skin exam): Right Central Frontal Scalp  Pre-operative length (cm): 1.3  Pre-operative width (cm): 1.5  Indications for Mohs surgery: ill-defined borders  Previously treated? No      Micrographic Surgery Details:  Post-operative length (cm): 2.6  Post-operative width (cm): 2.1  Number of Mohs stages: 2    Stage 1  Comments: The patient was brought into the operating room and placed in the procedure chair in the appropriate position.  The area positive by previous biopsy was identified and confirmed with the patient. The area of clinically obvious tumor was debulked using a curette and/or scalpel as needed. An incision was made following the Mohs approach through the skin. The specimen was taken to the lab, divided into 2 piece(s) and appropriately chromacoded and processed.  Tumor was seen on both the lateral and deep margins as indicated on the on the Mohs map.  Superficial basal cell carcinoma. Histologic examination revealed small buds of atypical basaloid cells with peripheral palisading and tumoral clefting.  Tumor features identified on Mohs section: basal carcinoma  Depth of invasion comment: epidermis    Stage 2  Comments: The area of positivity as noted on the Mohs map in the previous stage was identified and removed using the Mohs technique. The specimen was taken to the lab and appropriately chromacoded and processed in 1 piece(s).  Tumor features identified on Mohs section: no tumor identified  Depth of defect: subcutaneous fat    Patient tolerance of procedure: tolerated well, no immediate complications    Reconstruction:  Was the defect reconstructed? Yes    Was reconstruction performed by the same Mohs surgeon? Yes    Setting of reconstruction:  outpatient office  When was reconstruction performed? same day  Type of reconstruction: linear  Linear reconstruction: complex  Length of linear repair (cm): 4.4  Subcutaneous Layers (Deep Stitches)   Suture size:  3-0  Suture type:  Vicryl  Stitches:  Buried vertical mattress  Fine/surface layer approximation (top stitches)   Epidermal/Superficial suture size:  5-0  Epidermal/Superficial suture type:  Fast-absorbing gut  Stitches: simple running    Hemostasis achieved with: electrodesiccation  Outcome: patient tolerated procedure well with no complications    Post-procedure details: sterile dressing applied and wound care instructions given    Dressing type: Hypafix, pressure dressing, petrolatum and Telfa pad      Complex Linear Repair - Wide Undermining:  Given the location and size of the defect, it was determined that a complex layered linear closure was required to restore normal anatomy and function. The repair was considered complex because extensive undermining was required and performed. The amount of undermining performed was greater than the maximum width of the defect as measured perpendicular to the closure line along at least one entire edge of the defect. Standing cutaneous cones were removed using Burow's triangles. The wound edges were brought into close approximation with buried vertical mattress sutures. The remainder of the wound was then closed with epidermal top sutures.    The final repair measured 4.4 cm          Wound care was discussed, and the patient was given written post-operative wound care instructions.      The patient will follow up with Fabiola Martinez MD as needed for any post operative problems or concerns, and will follow up with their primary dermatologist as scheduled.

## 2024-05-20 NOTE — LETTER
May 30, 2024     Julia Barreto DO  [address]  [address]  [facility] [address]    Patient: Kena Bolaños   YOB: 1940   Date of Visit: 5/20/2024       Dear Dr. Julia Barreto DO:    Thank you for referring Kena Bolaños to me for evaluation. Below are my notes for this consultation.  If you have questions, please do not hesitate to call me. I look forward to following your patient along with you.       Sincerely,     Fabiola Martinez MD      CC: No Recipients  ______________________________________________________________________________________    Mohs Surgery Operative Note    Date of Surgery:  5/20/2024  Surgeon:  Fabiola Martinez MD  Office Location: [address]  [address] [facility] [address]  [facility] [address]  Dept: [phone]  Dept Fax: [phone]  Referring Provider: Julia Barreto DO  [address] [address], [address]  [place] [address]  Phone: [phone] Fax [phone]       Assessment/Plan  Pre-procedure:   Obtained informed consent: written from patient  The surgical site was identified and confirmed with the patient.     Intra-operative:   Audible time out called at : 9:48AM 05/20/24  by: Maria Victoria Victoria LPN  Verified patient name, birthdate, site, specimen bottle label & requisition.    The planned procedure(s) was again reviewed with the patient. The risks of bleeding, infection, nerve damage and scarring were reviewed. Written authorization was obtained. The patient identity, surgical site, and planned procedure(s) were verified. The provider acted as both surgeon and pathologist.     Squamous cell carcinoma of scalp  Right Central Frontal Scalp  Mohs surgery  Consent obtained: written    Universal Protocol:  Procedure explained and questions answered to patient or proxy's satisfaction: Yes    Test results available and properly labeled: Yes    Pathology report reviewed: Yes    External notes reviewed: Yes    Photo or diagram used for site  identification: Yes    Site/side marked: Yes    Slide independently reviewed by Mohs surgeon: Yes    Immediately prior to procedure a time out was called: Yes    Patient identity confirmed: verbally with patient  Preparation: Patient was prepped and draped in usual sterile fashion      Anticoagulation:  Is the patient taking prescription anticoagulant and/or aspirin prescribed/recommended by a physician? Yes    Was the anticoagulation regimen changed prior to Mohs? No      Anesthesia:  Anesthesia method: local infiltration  Local anesthetic: 0.5% Lidocaine with Epinephrine.    Procedure Details:  Biopsy accession number: [id number]  Date of biopsy: 4/18/2024  Pre-Op diagnosis: squamous cell carcinoma  Post-Op diagnosis: squamous cell carcinoma and basal cell carcinoma  Surgery side: right  Surgical site (from skin exam): Right Central Frontal Scalp  Pre-operative length (cm): 1.3  Pre-operative width (cm): 1.5  Indications for Mohs surgery: ill-defined borders  Previously treated? No      Micrographic Surgery Details:  Post-operative length (cm): 2.6  Post-operative width (cm): 2.1  Number of Mohs stages: 2    Stage 1  Comments: The patient was brought into the operating room and placed in the procedure chair in the appropriate position.  The area positive by previous biopsy was identified and confirmed with the patient. The area of clinically obvious tumor was debulked using a curette and/or scalpel as needed. An incision was made following the Mohs approach through the skin. The specimen was taken to the lab, divided into 2 piece(s) and appropriately chromacoded and processed.  Tumor was seen on both the lateral and deep margins as indicated on the on the Mohs map.  Superficial basal cell carcinoma. Histologic examination revealed small buds of atypical basaloid cells with peripheral palisading and tumoral clefting.  Tumor features identified on Mohs section: basal carcinoma  Depth of invasion comment:  epidermis    Stage 2  Comments: The area of positivity as noted on the Mohs map in the previous stage was identified and removed using the Mohs technique. The specimen was taken to the lab and appropriately chromacoded and processed in 1 piece(s).  Tumor features identified on Mohs section: no tumor identified  Depth of defect: subcutaneous fat    Patient tolerance of procedure: tolerated well, no immediate complications    Reconstruction:  Was the defect reconstructed? Yes    Was reconstruction performed by the same Mohs surgeon? Yes    Setting of reconstruction: outpatient office  When was reconstruction performed? same day  Type of reconstruction: linear  Linear reconstruction: complex  Length of linear repair (cm): 4.4  Subcutaneous Layers (Deep Stitches)   Suture size:  3-0  Suture type:  Vicryl  Stitches:  Buried vertical mattress  Fine/surface layer approximation (top stitches)   Epidermal/Superficial suture size:  5-0  Epidermal/Superficial suture type:  Fast-absorbing gut  Stitches: simple running    Hemostasis achieved with: electrodesiccation  Outcome: patient tolerated procedure well with no complications    Post-procedure details: sterile dressing applied and wound care instructions given    Dressing type: Hypafix, pressure dressing, petrolatum and Telfa pad      Complex Linear Repair - Wide Undermining:  Given the location and size of the defect, it was determined that a complex layered linear closure was required to restore normal anatomy and function. The repair was considered complex because extensive undermining was required and performed. The amount of undermining performed was greater than the maximum width of the defect as measured perpendicular to the closure line along at least one entire edge of the defect. Standing cutaneous cones were removed using Burow's triangles. The wound edges were brought into close approximation with buried vertical mattress sutures. The remainder of the wound was  then closed with epidermal top sutures.    The final repair measured 4.4 cm          Wound care was discussed, and the patient was given written post-operative wound care instructions.      The patient will follow up with Fabiola Martinez MD as needed for any post operative problems or concerns, and will follow up with their primary dermatologist as scheduled.        Office Visit Note  Date: 5/20/2024  Surgeon:  Fabiola Martinez MD  Office Location:  [address] [facility]  [address] [facility] [address]  [facility] [address]  Dept: [phone]  Dept Fax: [phone]  Referring Provider: Julia Barreto DO  [address], [address]  [place] [address]  Phone: [phone] Fax [phone]     Subjective  Kena Bolaños is a 83 y.o. female who presents for the following: MOHS Surgery    According to the patient, the lesion has been present for approximately 6 months at the time of diagnosis.  The lesion is not causing symptoms.  The lesion has not been treated previously.    The patient does not have a pacemaker / defibrillator.  The patient does not have a heart valve / joint replacement.    The patient is on blood thinners.  The patient does not have a history of hepatitis B or C.  The patient does not have a history of HIV.  The patient does have a history of immunosuppression (e.g. organ transplantation, malignancy, medications)    Review of Systems:  No other skin or systemic complaints other than what is documented elsewhere in the note.    MEDICAL HISTORY: clinically relevant history including significant past medical history, medications and allergies was reviewed and documented in Epic.    Objective  Well appearing patient in no apparent distress; mood and affect are within normal limits.  Vital signs: See record.  Noted on the Right Central Frontal Scalp  Is a 1.3 x 1.5 cm scar    The patient confirmed the identified site.    Discussion:  The nature of the diagnosis was  explained. The lesion is a skin cancer.  It has a risk of local growth and distant spread. The condition is associated with sun exposure.  Warning signs of non-melanoma skin cancer discussed. Patient was instructed to perform monthly self skin examination.  We recommended that the patient have regular full skin exams given an increased risk of subsequent skin cancers. The patient was instructed to use sun protective behaviors including use of broad spectrum sunscreens and sun protective clothing to reduce risk of skin cancers.      Risks, benefits, side effects of Mohs surgery were discussed with patient and the patient voiced understanding.  It was explained that even though the cure rate of Mohs is very high it is not 100%. Risks of surgery including but not limited to bleeding, infection, numbness, nerve damage, and scar were reviewed.  Discussion included wound care requirements, activity restrictions, likely scar outcome and time to heal.     After Mohs surgery, the defect may need to be repaired surgically and the scar may be longer than the original lesion.  Reconstruction options, risks, and benefits were reviewed including second intention healing, linear repair (4-1 ratio was explained), local flaps, skin grafts, cartilage grafts and interpolation flaps (the need for multiple surgeries was explained). Possible outcomes were reviewed including likely scar appearance, failure of flap survival, infection, bleeding and the need for revision surgery.     The pathology was reviewed, the photograph was reviewed, and the referring physician's note was reviewed.    Patient elected for Mohs surgery.

## 2024-05-20 NOTE — PROGRESS NOTES
Office Visit Note  Date: 5/20/2024  Surgeon:  Fabiola Martinez MD  Office Location:  [address] [facility]  [address]  [address]  [facility] [address]  Dept: [phone]  Dept Fax: [phone]  Referring Provider: Julia Barreto DO  [address], [address]  [place] [address]  Phone: [phone] Fax [phone]     Subjective   Kena Bolaños is a 83 y.o. female who presents for the following: MOHS Surgery    According to the patient, the lesion has been present for approximately 6 months at the time of diagnosis.  The lesion is not causing symptoms.  The lesion has not been treated previously.    The patient does not have a pacemaker / defibrillator.  The patient does not have a heart valve / joint replacement.    The patient is on blood thinners.  The patient does not have a history of hepatitis B or C.  The patient does not have a history of HIV.  The patient does have a history of immunosuppression (e.g. organ transplantation, malignancy, medications)    Review of Systems:  No other skin or systemic complaints other than what is documented elsewhere in the note.    MEDICAL HISTORY: clinically relevant history including significant past medical history, medications and allergies was reviewed and documented in Epic.    Objective   Well appearing patient in no apparent distress; mood and affect are within normal limits.  Vital signs: See record.  Noted on the Right Central Frontal Scalp  Is a 1.3 x 1.5 cm scar    The patient confirmed the identified site.    Discussion:  The nature of the diagnosis was explained. The lesion is a skin cancer.  It has a risk of local growth and distant spread. The condition is associated with sun exposure.  Warning signs of non-melanoma skin cancer discussed. Patient was instructed to perform monthly self skin examination.  We recommended that the patient have regular full skin exams given an increased risk of subsequent skin cancers. The  patient was instructed to use sun protective behaviors including use of broad spectrum sunscreens and sun protective clothing to reduce risk of skin cancers.      Risks, benefits, side effects of Mohs surgery were discussed with patient and the patient voiced understanding.  It was explained that even though the cure rate of Mohs is very high it is not 100%. Risks of surgery including but not limited to bleeding, infection, numbness, nerve damage, and scar were reviewed.  Discussion included wound care requirements, activity restrictions, likely scar outcome and time to heal.     After Mohs surgery, the defect may need to be repaired surgically and the scar may be longer than the original lesion.  Reconstruction options, risks, and benefits were reviewed including second intention healing, linear repair (4-1 ratio was explained), local flaps, skin grafts, cartilage grafts and interpolation flaps (the need for multiple surgeries was explained). Possible outcomes were reviewed including likely scar appearance, failure of flap survival, infection, bleeding and the need for revision surgery.     The pathology was reviewed, the photograph was reviewed, and the referring physician's note was reviewed.    Patient elected for Mohs surgery.

## 2024-06-03 ENCOUNTER — TELEPHONE (OUTPATIENT)
Dept: PRIMARY CARE | Facility: CLINIC | Age: 84
End: 2024-06-03
Payer: MEDICARE

## 2024-06-03 DIAGNOSIS — N18.4 CHRONIC RENAL DISEASE, STAGE IV (MULTI): Primary | ICD-10-CM

## 2024-06-03 NOTE — TELEPHONE ENCOUNTER
Recheck blood levels, if lower than 10  my need Procrit, CKD 4 would call  office, CBC, iron , B12 levels reordered. Left message

## 2024-06-03 NOTE — TELEPHONE ENCOUNTER
Pt is very fatigued x 1 mo. Pt requests blood work, concerned about being anemic.     LV  1/29/24  [place] 7/8/24 w/ Dr Martha Grace

## 2024-06-04 ENCOUNTER — LAB (OUTPATIENT)
Dept: LAB | Facility: LAB | Age: 84
End: 2024-06-04
Payer: MEDICARE

## 2024-06-04 DIAGNOSIS — N18.4 CHRONIC RENAL DISEASE, STAGE IV (MULTI): ICD-10-CM

## 2024-06-04 LAB
BASOPHILS # BLD AUTO: 0.06 X10*3/UL (ref 0–0.1)
BASOPHILS NFR BLD AUTO: 1 %
EOSINOPHIL # BLD AUTO: 0.14 X10*3/UL (ref 0–0.4)
EOSINOPHIL NFR BLD AUTO: 2.3 %
ERYTHROCYTE [DISTWIDTH] IN BLOOD BY AUTOMATED COUNT: 13.3 % (ref 11.5–14.5)
FERRITIN SERPL-MCNC: 89 NG/ML (ref 13–150)
HCT VFR BLD AUTO: 32.1 % (ref 36–46)
HGB BLD-MCNC: 10.6 G/DL (ref 12–16)
IMM GRANULOCYTES # BLD AUTO: 0.02 X10*3/UL (ref 0–0.5)
IMM GRANULOCYTES NFR BLD AUTO: 0.3 % (ref 0–0.9)
IRON SATN MFR SERPL: 20 % (ref 12–50)
IRON SERPL-MCNC: 54 UG/DL (ref 30–160)
LYMPHOCYTES # BLD AUTO: 2.12 X10*3/UL (ref 0.8–3)
LYMPHOCYTES NFR BLD AUTO: 34.2 %
MCH RBC QN AUTO: 28.4 PG (ref 26–34)
MCHC RBC AUTO-ENTMCNC: 33 G/DL (ref 32–36)
MCV RBC AUTO: 86 FL (ref 80–100)
MONOCYTES # BLD AUTO: 0.61 X10*3/UL (ref 0.05–0.8)
MONOCYTES NFR BLD AUTO: 9.8 %
NEUTROPHILS # BLD AUTO: 3.25 X10*3/UL (ref 1.6–5.5)
NEUTROPHILS NFR BLD AUTO: 52.4 %
NRBC BLD-RTO: 0 /100 WBCS (ref 0–0)
PLATELET # BLD AUTO: 278 X10*3/UL (ref 150–450)
RBC # BLD AUTO: 3.73 X10*6/UL (ref 4–5.2)
TIBC SERPL-MCNC: 275 UG/DL (ref 228–428)
UIBC SERPL-MCNC: 221 UG/DL (ref 110–370)
VIT B12 SERPL-MCNC: >2000 PG/ML (ref 211–946)
WBC # BLD AUTO: 6.2 X10*3/UL (ref 4.4–11.3)

## 2024-06-04 PROCEDURE — 36415 COLL VENOUS BLD VENIPUNCTURE: CPT

## 2024-06-04 PROCEDURE — 82728 ASSAY OF FERRITIN: CPT

## 2024-06-04 PROCEDURE — 82607 VITAMIN B-12: CPT

## 2024-06-04 PROCEDURE — 83550 IRON BINDING TEST: CPT

## 2024-06-04 PROCEDURE — 85025 COMPLETE CBC W/AUTO DIFF WBC: CPT

## 2024-06-04 PROCEDURE — 83540 ASSAY OF IRON: CPT

## 2024-06-10 DIAGNOSIS — N18.4 CHRONIC KIDNEY DISEASE, STAGE 4 (SEVERE) (MULTI): ICD-10-CM

## 2024-06-10 RX ORDER — SODIUM BICARBONATE 650 MG/1
650 TABLET ORAL 2 TIMES DAILY
Qty: 180 TABLET | Refills: 2 | Status: SHIPPED | OUTPATIENT
Start: 2024-06-10

## 2024-07-08 ENCOUNTER — OFFICE VISIT (OUTPATIENT)
Dept: PRIMARY CARE | Facility: CLINIC | Age: 84
End: 2024-07-08
Payer: MEDICARE

## 2024-07-08 VITALS
BODY MASS INDEX: 23.74 KG/M2 | DIASTOLIC BLOOD PRESSURE: 56 MMHG | HEART RATE: 66 BPM | TEMPERATURE: 97.5 F | SYSTOLIC BLOOD PRESSURE: 118 MMHG | OXYGEN SATURATION: 99 % | WEIGHT: 134 LBS

## 2024-07-08 DIAGNOSIS — I10 ESSENTIAL HYPERTENSION: Primary | ICD-10-CM

## 2024-07-08 DIAGNOSIS — E03.9 ACQUIRED HYPOTHYROIDISM: ICD-10-CM

## 2024-07-08 DIAGNOSIS — I25.10 ATHEROSCLEROSIS OF NATIVE CORONARY ARTERY OF NATIVE HEART WITHOUT ANGINA PECTORIS: ICD-10-CM

## 2024-07-08 DIAGNOSIS — N18.4 ANEMIA OF CHRONIC RENAL FAILURE, STAGE 4 (SEVERE) (MULTI): ICD-10-CM

## 2024-07-08 DIAGNOSIS — D63.1 ANEMIA OF CHRONIC RENAL FAILURE, STAGE 4 (SEVERE) (MULTI): ICD-10-CM

## 2024-07-08 DIAGNOSIS — I12.9 BENIGN HYPERTENSIVE KIDNEY DISEASE WITH CHRONIC KIDNEY DISEASE STAGE I THROUGH STAGE IV, OR UNSPECIFIED(403.10): ICD-10-CM

## 2024-07-08 DIAGNOSIS — M05.9 RHEUMATOID ARTHRITIS WITH POSITIVE RHEUMATOID FACTOR, INVOLVING UNSPECIFIED SITE (MULTI): ICD-10-CM

## 2024-07-08 PROCEDURE — 99214 OFFICE O/P EST MOD 30 MIN: CPT | Mod: 25 | Performed by: INTERNAL MEDICINE

## 2024-07-08 PROCEDURE — 1159F MED LIST DOCD IN RCRD: CPT | Performed by: INTERNAL MEDICINE

## 2024-07-08 PROCEDURE — 1125F AMNT PAIN NOTED PAIN PRSNT: CPT | Performed by: INTERNAL MEDICINE

## 2024-07-08 PROCEDURE — 99214 OFFICE O/P EST MOD 30 MIN: CPT | Performed by: INTERNAL MEDICINE

## 2024-07-08 PROCEDURE — 1157F ADVNC CARE PLAN IN RCRD: CPT | Performed by: INTERNAL MEDICINE

## 2024-07-08 PROCEDURE — 90715 TDAP VACCINE 7 YRS/> IM: CPT | Performed by: INTERNAL MEDICINE

## 2024-07-08 PROCEDURE — 3074F SYST BP LT 130 MM HG: CPT | Performed by: INTERNAL MEDICINE

## 2024-07-08 PROCEDURE — 3078F DIAST BP <80 MM HG: CPT | Performed by: INTERNAL MEDICINE

## 2024-07-08 ASSESSMENT — PATIENT HEALTH QUESTIONNAIRE - PHQ9
SUM OF ALL RESPONSES TO PHQ9 QUESTIONS 1 AND 2: 0
2. FEELING DOWN, DEPRESSED OR HOPELESS: NOT AT ALL
1. LITTLE INTEREST OR PLEASURE IN DOING THINGS: NOT AT ALL

## 2024-07-08 ASSESSMENT — ENCOUNTER SYMPTOMS
OCCASIONAL FEELINGS OF UNSTEADINESS: 0
DEPRESSION: 0
LOSS OF SENSATION IN FEET: 0

## 2024-07-08 ASSESSMENT — PAIN SCALES - GENERAL: PAINLEVEL: 3

## 2024-07-08 NOTE — PROGRESS NOTES
Subjective   Patient ID: Kena Bolaños is a 84 y.o. female who presents for Establish Care.    HPI     Has history of rheumatoid arthritis, CKD stage IV, anemia, coronary artery disease, hypertension, hypothyroidism, postpolio syndrome  History of rheumatoid arthritis -seeing Dr. Ramos  H/O osteoporosis, being managed by Dr Ramos-  on Prolia injections  History of CKD, follows with Dr. 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   HENT:  Negative for postnasal drip, sinus pressure and trouble swallowing.    Respiratory:  Negative for cough, shortness of breath and wheezing.    Cardiovascular:  Negative for chest pain, palpitations and leg swelling.   Gastrointestinal:  Negative for abdominal pain, blood in stool, nausea and vomiting.   Endocrine: Negative for polydipsia, polyphagia and polyuria.   Genitourinary:  Negative for dysuria and frequency.   Musculoskeletal:  Positive for arthralgias. Negative for back pain and myalgias.   Skin:  Negative for rash.   Neurological:  Negative for tremors, seizures and numbness.   Psychiatric/Behavioral:  Negative for behavioral problems.        Objective   /56 (BP Location: Left arm, Patient Position: Sitting, BP Cuff Size: Adult)   Pulse 66   Temp 36.4 °C (97.5 °F) (Temporal)   Wt 60.8 kg (134 lb)   SpO2 99%   BMI 23.74 kg/m²     Physical Exam  Constitutional:       General: She is not in acute distress.     Appearance: Normal appearance.   HENT:      Head: Normocephalic and atraumatic.   Eyes:      Extraocular Movements: Extraocular movements intact.      Pupils: Pupils are equal, round, and reactive to light.    Cardiovascular:      Rate and Rhythm: Normal rate and regular rhythm.      Heart sounds: Normal heart sounds. No murmur heard.     No friction rub.   Pulmonary:      Effort: Pulmonary effort is normal.      Breath sounds: Normal breath sounds. No wheezing or rales.   Abdominal:      General: Bowel sounds are normal.      Palpations: Abdomen is soft.      Tenderness: There is no abdominal tenderness. There is no guarding.   Musculoskeletal:         General: Normal range of motion.      Cervical back: Normal range of motion and neck supple.      Right lower leg: No edema.      Left lower leg: No edema.   Lymphadenopathy:      Cervical: No cervical adenopathy.   Skin:     General: Skin is warm and dry.      Findings: No rash.   Neurological:      General: No focal deficit present.      Mental Status: She is alert and oriented to person, place, and time.      Cranial Nerves: No cranial nerve deficit.   Psychiatric:         Mood and Affect: Mood normal.         Assessment/Plan        Kena was seen today for establish care.  Diagnoses and all orders for this visit:  Essential hypertension (Primary)  Atherosclerosis of native coronary artery of native heart without angina pectoris  Rheumatoid arthritis with positive rheumatoid factor, involving unspecified site (Multi)  Acquired hypothyroidism  -     TSH with reflex to Free T4 if abnormal; Future  Benign hypertensive kidney disease with chronic kidney disease stage I through stage IV, or unspecified(403.10)  Anemia of chronic renal failure, stage 4 (severe) (Multi)  Other orders  -     Follow Up In Primary Care - Established  -     Tdap vaccine, age 7 years and older    Reviewed prior records and labs    Lab Results   Component Value Date    WBC 6.2 06/04/2024    HGB 10.6 (L) 06/04/2024    HCT 32.1 (L) 06/04/2024    MCV 86 06/04/2024     06/04/2024     Lab Results   Component Value Date    GLUCOSE 99 03/13/2024    CALCIUM 8.7 03/13/2024     03/13/2024    K  4.6 03/13/2024    CO2 21 (L) 03/13/2024     03/13/2024    BUN 27 (H) 03/13/2024    CREATININE 1.90 (H) 03/13/2024       Current Outpatient Medications   Medication Instructions    acetaminophen (TylenoL) 325 mg tablet 2 tablets, oral, 2-3 times a day    amLODIPine (NORVASC) 2.5 mg, oral, Nightly    atorvastatin (Lipitor) 80 mg tablet 1 tablet, oral, Daily    cyanocobalamin (Vitamin B-12) 1,000 mcg tablet 1 tablet, oral, Daily, As directed    denosumab (PROLIA) 60 mg, subcutaneous, Every 6 months    diclofenac epolamine 1.3 % patch transdermal, 2 times daily, Affected area    docusate sodium (Colace) 100 mg capsule 1 capsule, oral, 2 times daily PRN    hydroxychloroquine (PLAQUENIL) 200 mg, oral, Daily    levothyroxine (Synthroid, Levoxyl) 75 mcg tablet TAKE 1 TABLET BY MOUTH IN THE MORNING ON AN EMPTY STOMACH MONDAY-SATURDAY    methyl salicylate/menth/camph (SALONPAS TOP) transdermal, 2 times weekly    metoprolol tartrate (Lopressor) 50 mg tablet 1 tablet, oral, 2 times daily (morning and late afternoon)    nitroglycerin (Nitrostat) 0.4 mg SL tablet sublingual, PLACE 1 TABLET UNDER THE TONGUE EVERY 5 MINUTES FOR UP TO 3 DOSES AS NEEDED FOR CHEST PAIN.CALL 911 IF PAIN PERSISTS.    omeprazole (PriLOSEC) 40 mg DR capsule 1 capsule, oral, Daily with evening meal, gerd    pantoprazole (ProtoNix) 40 mg EC tablet 1 tablet, oral, Daily    sevelamer carbonate (RENVELA) 800 mg, oral, 2 times daily, Swallow tablet whole; do not crush, break, or chew.    sodium bicarbonate 650 mg, oral, 2 times daily    tetrahydroz/dext 70/peg 400/pv (EYE DROPS ADVANCED RELIEF OPHT) Eye Drops Relief

## 2024-07-09 ASSESSMENT — ENCOUNTER SYMPTOMS
POLYDIPSIA: 0
ABDOMINAL PAIN: 0
DYSURIA: 0
VOMITING: 0
FREQUENCY: 0
TROUBLE SWALLOWING: 0
TREMORS: 0
NUMBNESS: 0
SEIZURES: 0
SINUS PRESSURE: 0
BLOOD IN STOOL: 0
PALPITATIONS: 0
MYALGIAS: 0
ARTHRALGIAS: 1
SHORTNESS OF BREATH: 0
BACK PAIN: 0
CHILLS: 0
UNEXPECTED WEIGHT CHANGE: 0
FATIGUE: 0
NAUSEA: 0
POLYPHAGIA: 0
COUGH: 0
WHEEZING: 0

## 2024-07-11 DIAGNOSIS — N18.4 CHRONIC KIDNEY DISEASE, STAGE 4 (SEVERE) (MULTI): Primary | ICD-10-CM

## 2024-07-17 ENCOUNTER — LAB (OUTPATIENT)
Dept: LAB | Facility: LAB | Age: 84
End: 2024-07-17
Payer: MEDICARE

## 2024-07-17 DIAGNOSIS — E03.9 ACQUIRED HYPOTHYROIDISM: ICD-10-CM

## 2024-07-17 DIAGNOSIS — N18.4 CHRONIC KIDNEY DISEASE, STAGE 4 (SEVERE) (MULTI): ICD-10-CM

## 2024-07-17 LAB
ALBUMIN SERPL-MCNC: 4 G/DL (ref 3.5–5)
ANION GAP SERPL CALC-SCNC: 15 MMOL/L
BUN SERPL-MCNC: 29 MG/DL (ref 8–25)
CALCIUM SERPL-MCNC: 9.5 MG/DL (ref 8.5–10.4)
CHLORIDE SERPL-SCNC: 104 MMOL/L (ref 97–107)
CO2 SERPL-SCNC: 22 MMOL/L (ref 24–31)
CREAT SERPL-MCNC: 2.2 MG/DL (ref 0.4–1.6)
EGFRCR SERPLBLD CKD-EPI 2021: 22 ML/MIN/1.73M*2
ERYTHROCYTE [DISTWIDTH] IN BLOOD BY AUTOMATED COUNT: 13.2 % (ref 11.5–14.5)
GLUCOSE SERPL-MCNC: 107 MG/DL (ref 65–99)
HCT VFR BLD AUTO: 31 % (ref 36–46)
HGB BLD-MCNC: 10.4 G/DL (ref 12–16)
MCH RBC QN AUTO: 29.2 PG (ref 26–34)
MCHC RBC AUTO-ENTMCNC: 33.5 G/DL (ref 32–36)
MCV RBC AUTO: 87 FL (ref 80–100)
NRBC BLD-RTO: 0 /100 WBCS (ref 0–0)
PHOSPHATE SERPL-MCNC: 6.3 MG/DL (ref 2.5–4.5)
PLATELET # BLD AUTO: 264 X10*3/UL (ref 150–450)
POTASSIUM SERPL-SCNC: 4.7 MMOL/L (ref 3.4–5.1)
RBC # BLD AUTO: 3.56 X10*6/UL (ref 4–5.2)
SODIUM SERPL-SCNC: 141 MMOL/L (ref 133–145)
T4 FREE SERPL-MCNC: 1.7 NG/DL (ref 0.9–1.7)
TSH SERPL DL<=0.05 MIU/L-ACNC: 0.15 MIU/L (ref 0.27–4.2)
WBC # BLD AUTO: 6.2 X10*3/UL (ref 4.4–11.3)

## 2024-07-17 PROCEDURE — 80069 RENAL FUNCTION PANEL: CPT

## 2024-07-17 PROCEDURE — 84439 ASSAY OF FREE THYROXINE: CPT

## 2024-07-17 PROCEDURE — 85027 COMPLETE CBC AUTOMATED: CPT

## 2024-07-17 PROCEDURE — 36415 COLL VENOUS BLD VENIPUNCTURE: CPT

## 2024-07-17 PROCEDURE — 84443 ASSAY THYROID STIM HORMONE: CPT

## 2024-07-17 PROCEDURE — 83970 ASSAY OF PARATHORMONE: CPT

## 2024-07-18 LAB — PTH-INTACT SERPL-MCNC: 57 PG/ML (ref 18.5–88)

## 2024-07-30 ENCOUNTER — EVALUATION (OUTPATIENT)
Dept: PHYSICAL THERAPY | Facility: CLINIC | Age: 84
End: 2024-07-30
Payer: MEDICARE

## 2024-07-30 DIAGNOSIS — M25.561 RIGHT KNEE PAIN: Primary | ICD-10-CM

## 2024-07-30 PROCEDURE — 97161 PT EVAL LOW COMPLEX 20 MIN: CPT | Mod: GP

## 2024-07-30 NOTE — PROGRESS NOTES
Physical Therapy    Physical Therapy Evaluation    Patient Name: Kena Bolaños  MRN: [id number]  Today's Date: 7/30/2024    Time Entry:                            Assessment  PT Assessment Results: Decreased strength, Decreased range of motion, Impaired balance, Decreased endurance, Decreased mobility, Decreased coordination, Painpt is a 84 year old F who participated in a physical therapy evaluation today due to burning sensation felt in her L knee.  Otherwise pt does not report any pain or functional limitation. Pt exercises regularly, 3x/week in aquatic setting.  Pt has a high copay cost, and is active otherwise Discussed with the patient that I would recommend she cont with her aquatic exercises. If her symptoms do not improve within the next month to schedule for further PT at that time. Pt agrees     Copay cost $35/visit     Plan  No additional PT at this time. Discharge PT     Current Problem  No diagnosis found.    Subjective   RFV: knee symptoms, likely resulting from lumbar radiculopathy per MDs referral. Pt reports that she has been having L knee symptoms for years. Is very active otherwise, exercises 3x/week in aquatic setting.   Pain: 3/10- constant   Description of pain: Burning symptoms    Objective      Range of Motion:   Lumbar AROM  Flexion> ext    Strength:   RLE ; 4/5  LLE 4/5   Flexibility:    WFL  Palpation:   No significant tenderness noted   Special Tests:      Gait:    Pt ambulates with standard cane   OP EDUCATION:  Outpatient Education  Individual(s) Educated: Patient  Risk and Benefits Discussed with Patient/Caregiver/Other: yes  Patient/Caregiver Demonstrated Understanding: yes  Plan of Care Discussed and Agreed Upon: yes    Goals:  No goals

## 2024-08-05 ENCOUNTER — OFFICE VISIT (OUTPATIENT)
Dept: RHEUMATOLOGY | Facility: CLINIC | Age: 84
End: 2024-08-05
Payer: MEDICARE

## 2024-08-05 ENCOUNTER — APPOINTMENT (OUTPATIENT)
Dept: PRIMARY CARE | Facility: CLINIC | Age: 84
End: 2024-08-05
Payer: MEDICARE

## 2024-08-05 VITALS — BODY MASS INDEX: 23.21 KG/M2 | DIASTOLIC BLOOD PRESSURE: 58 MMHG | SYSTOLIC BLOOD PRESSURE: 104 MMHG | WEIGHT: 131 LBS

## 2024-08-05 DIAGNOSIS — M05.9 RHEUMATOID ARTHRITIS WITH POSITIVE RHEUMATOID FACTOR, INVOLVING UNSPECIFIED SITE (MULTI): Primary | ICD-10-CM

## 2024-08-05 PROCEDURE — 99214 OFFICE O/P EST MOD 30 MIN: CPT | Performed by: INTERNAL MEDICINE

## 2024-08-05 PROCEDURE — 3078F DIAST BP <80 MM HG: CPT | Performed by: INTERNAL MEDICINE

## 2024-08-05 PROCEDURE — 1159F MED LIST DOCD IN RCRD: CPT | Performed by: INTERNAL MEDICINE

## 2024-08-05 PROCEDURE — 3074F SYST BP LT 130 MM HG: CPT | Performed by: INTERNAL MEDICINE

## 2024-08-05 PROCEDURE — 1157F ADVNC CARE PLAN IN RCRD: CPT | Performed by: INTERNAL MEDICINE

## 2024-08-05 PROCEDURE — 20610 DRAIN/INJ JOINT/BURSA W/O US: CPT | Mod: RT | Performed by: INTERNAL MEDICINE

## 2024-08-05 PROCEDURE — 2500000004 HC RX 250 GENERAL PHARMACY W/ HCPCS (ALT 636 FOR OP/ED): Performed by: INTERNAL MEDICINE

## 2024-08-05 RX ORDER — TRIAMCINOLONE ACETONIDE 40 MG/ML
40 INJECTION, SUSPENSION INTRA-ARTICULAR; INTRAMUSCULAR
Status: COMPLETED | OUTPATIENT
Start: 2024-08-05 | End: 2024-08-05

## 2024-08-05 NOTE — PROGRESS NOTES
"Recheck  OA  /  RA  /  OP  ( Due for Prolia - Tript )  C/O right knee pain x 6 weeks.  ? Cortisone injection today    HPI - R knee is \"ragingly painful\" x 6 wks - ?swelling - she has been icing it.  Some hand pain, no swelling.  AM stiffness 30-45 min.  No CP, resp, or GI.    PE  NAD  RRR no r/m/g  CTA  No edema  No synovitis  R knee tender and pain with ROM    A/P - OA and RA - R knee pain but otherwise stable  R knee injection - expl risks/benefits.  Pt gave written consent.  Aseptic tech, injected with 40mg kenalog and 1ml 1% lido  OP - due for prolia  Reviewed recent labs - CRF  Follow up 6 mo or sooner PRN  Patient ID: Kena Bolaños is a 84 y.o. female.    Large Joint Injection/Arthrocentesis: R knee on 8/5/2024 11:12 AM  Indications: pain  Medications: 40 mg triamcinolone acetonide 40 mg/mL  Procedure, treatment alternatives, risks and benefits explained, specific risks discussed. Consent was given by the patient.           "

## 2024-08-11 DIAGNOSIS — M05.9 RHEUMATOID ARTHRITIS WITH POSITIVE RHEUMATOID FACTOR, INVOLVING UNSPECIFIED SITE (MULTI): ICD-10-CM

## 2024-08-12 RX ORDER — HYDROXYCHLOROQUINE SULFATE 200 MG/1
TABLET, FILM COATED ORAL DAILY
Qty: 90 TABLET | Refills: 1 | Status: SHIPPED | OUTPATIENT
Start: 2024-08-12

## 2024-08-18 ENCOUNTER — TELEPHONE (OUTPATIENT)
Dept: PRIMARY CARE | Facility: CLINIC | Age: 84
End: 2024-08-18
Payer: MEDICARE

## 2024-08-18 DIAGNOSIS — U07.1 COVID-19: Primary | ICD-10-CM

## 2024-08-18 RX ORDER — NIRMATRELVIR AND RITONAVIR 150-100 MG
2 KIT ORAL 2 TIMES DAILY
Qty: 20 TABLET | Refills: 0 | Status: SHIPPED | OUTPATIENT
Start: 2024-08-18 | End: 2024-08-23

## 2024-08-18 NOTE — TELEPHONE ENCOUNTER
Received page to call her about testing positive for covid-have left 2 messages on her voice mail and have had no answer or return call as of 9:38  8/18.    She called again later in afternoon-after 2 attempts did speak with her    Became ill yesterday-body aches, chills, cough, sore throat-worse today. Today tested positive covid.    Wants paxlovid-advised holding atorvastatin and cutting amlodipine in half.    Advised call or ER if worse

## 2024-08-20 ENCOUNTER — TRANSCRIBE ORDERS (OUTPATIENT)
Dept: INFUSION THERAPY | Facility: CLINIC | Age: 84
End: 2024-08-20
Payer: MEDICARE

## 2024-08-20 DIAGNOSIS — M81.0 OSTEOPOROSIS, SENILE: Primary | ICD-10-CM

## 2024-08-29 ENCOUNTER — APPOINTMENT (OUTPATIENT)
Dept: INFUSION THERAPY | Facility: CLINIC | Age: 84
End: 2024-08-29
Payer: MEDICARE

## 2024-09-03 ENCOUNTER — TELEPHONE (OUTPATIENT)
Dept: PRIMARY CARE | Facility: CLINIC | Age: 84
End: 2024-09-03
Payer: MEDICARE

## 2024-09-03 DIAGNOSIS — E03.9 ACQUIRED HYPOTHYROIDISM: Primary | ICD-10-CM

## 2024-09-05 ENCOUNTER — LAB (OUTPATIENT)
Dept: LAB | Facility: LAB | Age: 84
End: 2024-09-05
Payer: MEDICARE

## 2024-09-05 DIAGNOSIS — E03.9 ACQUIRED HYPOTHYROIDISM: ICD-10-CM

## 2024-09-05 DIAGNOSIS — M81.0 OSTEOPOROSIS, SENILE: ICD-10-CM

## 2024-09-05 DIAGNOSIS — N18.4 CHRONIC KIDNEY DISEASE, STAGE 4 (SEVERE) (MULTI): ICD-10-CM

## 2024-09-05 LAB
ERYTHROCYTE [DISTWIDTH] IN BLOOD BY AUTOMATED COUNT: 13.1 % (ref 11.5–14.5)
HCT VFR BLD AUTO: 31.1 % (ref 36–46)
HGB BLD-MCNC: 10.5 G/DL (ref 12–16)
MCH RBC QN AUTO: 29.3 PG (ref 26–34)
MCHC RBC AUTO-ENTMCNC: 33.8 G/DL (ref 32–36)
MCV RBC AUTO: 87 FL (ref 80–100)
NRBC BLD-RTO: 0 /100 WBCS (ref 0–0)
PLATELET # BLD AUTO: 354 X10*3/UL (ref 150–450)
RBC # BLD AUTO: 3.58 X10*6/UL (ref 4–5.2)
WBC # BLD AUTO: 8.1 X10*3/UL (ref 4.4–11.3)

## 2024-09-05 PROCEDURE — 84443 ASSAY THYROID STIM HORMONE: CPT

## 2024-09-05 PROCEDURE — 80053 COMPREHEN METABOLIC PANEL: CPT

## 2024-09-05 PROCEDURE — 36415 COLL VENOUS BLD VENIPUNCTURE: CPT

## 2024-09-05 PROCEDURE — 85027 COMPLETE CBC AUTOMATED: CPT

## 2024-09-06 LAB
ALBUMIN SERPL BCP-MCNC: 4.1 G/DL (ref 3.4–5)
ALP SERPL-CCNC: 53 U/L (ref 33–136)
ALT SERPL W P-5'-P-CCNC: 11 U/L (ref 7–45)
ANION GAP SERPL CALC-SCNC: 17 MMOL/L (ref 10–20)
AST SERPL W P-5'-P-CCNC: 15 U/L (ref 9–39)
BILIRUB SERPL-MCNC: 0.6 MG/DL (ref 0–1.2)
BUN SERPL-MCNC: 32 MG/DL (ref 6–23)
CALCIUM SERPL-MCNC: 9.4 MG/DL (ref 8.6–10.6)
CHLORIDE SERPL-SCNC: 100 MMOL/L (ref 98–107)
CO2 SERPL-SCNC: 27 MMOL/L (ref 21–32)
CREAT SERPL-MCNC: 2.15 MG/DL (ref 0.5–1.05)
EGFRCR SERPLBLD CKD-EPI 2021: 22 ML/MIN/1.73M*2
GLUCOSE SERPL-MCNC: 72 MG/DL (ref 74–99)
POTASSIUM SERPL-SCNC: 4.6 MMOL/L (ref 3.5–5.3)
PROT SERPL-MCNC: 6.7 G/DL (ref 6.4–8.2)
SODIUM SERPL-SCNC: 139 MMOL/L (ref 136–145)
TSH SERPL-ACNC: 0.94 MIU/L (ref 0.44–3.98)

## 2024-09-24 ENCOUNTER — APPOINTMENT (OUTPATIENT)
Dept: INFUSION THERAPY | Facility: CLINIC | Age: 84
End: 2024-09-24
Payer: MEDICARE

## 2024-09-24 VITALS
BODY MASS INDEX: 23.74 KG/M2 | OXYGEN SATURATION: 99 % | SYSTOLIC BLOOD PRESSURE: 146 MMHG | RESPIRATION RATE: 16 BRPM | WEIGHT: 134 LBS | HEART RATE: 78 BPM | DIASTOLIC BLOOD PRESSURE: 69 MMHG | TEMPERATURE: 97.7 F

## 2024-09-24 DIAGNOSIS — M81.0 OSTEOPOROSIS, SENILE: ICD-10-CM

## 2024-09-24 PROCEDURE — 96372 THER/PROPH/DIAG INJ SC/IM: CPT | Performed by: NURSE PRACTITIONER

## 2024-09-24 RX ORDER — DIPHENHYDRAMINE HYDROCHLORIDE 50 MG/ML
50 INJECTION INTRAMUSCULAR; INTRAVENOUS AS NEEDED
OUTPATIENT
Start: 2025-02-21

## 2024-09-24 RX ORDER — ALBUTEROL SULFATE 0.83 MG/ML
3 SOLUTION RESPIRATORY (INHALATION) AS NEEDED
OUTPATIENT
Start: 2025-02-21

## 2024-09-24 RX ORDER — EPINEPHRINE 0.3 MG/.3ML
0.3 INJECTION SUBCUTANEOUS EVERY 5 MIN PRN
OUTPATIENT
Start: 2025-02-21

## 2024-09-24 RX ORDER — FAMOTIDINE 10 MG/ML
20 INJECTION INTRAVENOUS ONCE AS NEEDED
OUTPATIENT
Start: 2025-02-21

## 2024-09-24 ASSESSMENT — ENCOUNTER SYMPTOMS
BACK PAIN: 1
NUMBNESS: 0
LIGHT-HEADEDNESS: 0
WOUND: 0
EXTREMITY WEAKNESS: 0
DIZZINESS: 0
LEG SWELLING: 0
SHORTNESS OF BREATH: 0
COUGH: 0
PALPITATIONS: 0
WHEEZING: 0

## 2024-09-24 ASSESSMENT — PAIN SCALES - GENERAL: PAINLEVEL: 0-NO PAIN

## 2024-09-24 NOTE — PROGRESS NOTES
[facility]   Infusion Clinic Note   Date: 2024   Name: Kena Bolaños  : 1940   MRN: [id number]          Reason for Visit: Injections (Prolia)         Today: We administered denosumab.       Visit Type: INJECTION       Ordered By: Dr Fernandes       Accompanied by:Self       Diagnosis: Osteoporosis, senile        Allergies:   Allergies as of 2024 - Reviewed 2024   Allergen Reaction Noted    Penicillins Hives 2023    Sulfa (sulfonamide antibiotics) Hives 2023    Losartan potassium Other 2023    Bee venom protein (honey bee) Rash 2023          Current Medications has a current medication list which includes the following prescription(s): acetaminophen, amlodipine, atorvastatin, cyanocobalamin, denosumab, diclofenac epolamine, docusate sodium, hydroxychloroquine, levothyroxine, methyl salicylate/menth/camph, metoprolol tartrate, pantoprazole, sodium bicarbonate, tetrahydroz/dext 70/peg 400/pv, nitroglycerin, omeprazole, and sevelamer carbonate.       Vitals:   Vitals:    24 1054   BP: 146/69   Pulse: 78   Resp: 16   Temp: 36.5 °C (97.7 °F)   TempSrc: Temporal   SpO2: 99%   Weight: 60.8 kg (134 lb)   PainSc: 0-No pain             Infusion Pre-procedure Checklist:   - Allergies reviewed: yes   - Medications reviewed: yes       - Previous reaction to current treatment: no      Assess patient for the concerns below. Document provider notification as appropriate.  - Active or recent infection with/without current antibiotic use: no  - Recent or planned invasive dental work: no  - Recent or planned surgeries: no  - Recently received or plans to receive vaccinations: no  - Has treatment related toxicities: no  - Is pregnant:  no      Pain: 0   - Is the pain different from normal: no   - Is your Doctor aware:  n/a       Labs: N/A          Fall Risk Screening: John Fall Risk  History of Falling, Immediate or Within 3 Months: No  Secondary  Diagnosis: No  Ambulatory Aid: Walks without aid/bedrest/nurse assist  Intravenous Therapy/Heparin Lock: No  Gait/Transferring: Normal/bedrest/immobile  Mental Status: Oriented to own ability  Lopez Fall Risk Score: 0       Review Of Systems:  Review of Systems   Respiratory:  Negative for cough, shortness of breath and wheezing.    Cardiovascular:  Negative for chest pain, leg swelling and palpitations.   Musculoskeletal:  Positive for back pain.   Skin:  Negative for itching, rash and wound.   Neurological:  Negative for dizziness, extremity weakness, light-headedness and numbness.         ROS completed? Yes      Infusion Readiness:  - Assessment Concerns Related to Infusion: Yes, EGFR 22.   - Provider notified: Yes, Dr Fernandes notified, okay to give       Document Below Only If Indicated:   New Patient Education:    N/A (returning patient for continuation of therapy. Ongoing education provided as needed.)        Treatment Conditions & Drug Specific Questions:    Denosumab  (PROLIA. XGEVA)    (Unless otherwise specified on patient specific therapy plan):     TREATMENT CONDITIONS:  Unless otherwise specified on patient specific therapy plan HOLD and notify provider prior to proceeding with today's injection if patients:  o Calcium is LESS THAN 8.6 mg/dL OR  Ionized Calcium LESS THAN 1.1 mmol/L  o Recent or planned invasive dental procedure (within 4 weeks)    Lab Results   Component Value Date    CALCIUM 9.4 09/05/2024    PHOS 6.3 (H) 07/17/2024      Lab Results   Component Value Date    CAION 1.14 02/21/2024       Labs reviewed and patient meets treatment conditions? Yes    DRUG SPECIFIC QUESTIONS:  Is the patient taking calcium and vitamin D? Yes  (Recommended)    Pt Instructed on following risks: (1) hypocalcemia, (2) osteonecrosis of the jaw, (3) atypical femoral fractures, (4) serious infections, and (5) dermatologic reactions?  Yes      REMINDER:  PREGNANCY CATEGORY X DRUG. OBTAIN NEGTATIVE PREGNANCY TEST PRIOR  TO FIRST INFUSION FOR WOMEN OF CHILDBEARING ABILITY   REMS DRUG    Recommended Vitals/Observation:  Vitals: Obtain vitals prior to injection.  Observation: Patient may leave immediately following injection.        Weight Based Drug Calculations:    WEIGHT BASED DRUGS: NOT APPLICABLE / FLAT DOSE          Initiated By: Charity Fuller RN

## 2024-09-30 DIAGNOSIS — E03.9 HYPOTHYROIDISM, UNSPECIFIED: ICD-10-CM

## 2024-09-30 RX ORDER — LEVOTHYROXINE SODIUM 75 UG/1
TABLET ORAL
Qty: 78 TABLET | Refills: 1 | Status: SHIPPED | OUTPATIENT
Start: 2024-09-30

## 2024-10-09 ENCOUNTER — TELEPHONE (OUTPATIENT)
Dept: PRIMARY CARE | Facility: CLINIC | Age: 84
End: 2024-10-09
Payer: MEDICARE

## 2024-10-09 DIAGNOSIS — Z12.31 SCREENING MAMMOGRAM FOR BREAST CANCER: ICD-10-CM

## 2024-10-17 ENCOUNTER — OFFICE VISIT (OUTPATIENT)
Dept: PRIMARY CARE | Facility: CLINIC | Age: 84
End: 2024-10-17
Payer: MEDICARE

## 2024-10-17 VITALS
OXYGEN SATURATION: 99 % | TEMPERATURE: 97.3 F | DIASTOLIC BLOOD PRESSURE: 58 MMHG | HEART RATE: 77 BPM | WEIGHT: 132 LBS | SYSTOLIC BLOOD PRESSURE: 116 MMHG | BODY MASS INDEX: 23.38 KG/M2

## 2024-10-17 DIAGNOSIS — H92.02 LEFT EAR PAIN: ICD-10-CM

## 2024-10-17 DIAGNOSIS — R09.81 NASAL CONGESTION: Primary | ICD-10-CM

## 2024-10-17 DIAGNOSIS — M79.89 PAIN AND SWELLING OF RIGHT LOWER LEG: ICD-10-CM

## 2024-10-17 DIAGNOSIS — M79.661 PAIN AND SWELLING OF RIGHT LOWER LEG: ICD-10-CM

## 2024-10-17 PROCEDURE — G2211 COMPLEX E/M VISIT ADD ON: HCPCS | Performed by: INTERNAL MEDICINE

## 2024-10-17 PROCEDURE — 3078F DIAST BP <80 MM HG: CPT | Performed by: INTERNAL MEDICINE

## 2024-10-17 PROCEDURE — 99214 OFFICE O/P EST MOD 30 MIN: CPT | Performed by: INTERNAL MEDICINE

## 2024-10-17 PROCEDURE — 3074F SYST BP LT 130 MM HG: CPT | Performed by: INTERNAL MEDICINE

## 2024-10-17 PROCEDURE — 1157F ADVNC CARE PLAN IN RCRD: CPT | Performed by: INTERNAL MEDICINE

## 2024-10-17 PROCEDURE — 1159F MED LIST DOCD IN RCRD: CPT | Performed by: INTERNAL MEDICINE

## 2024-10-17 PROCEDURE — 1125F AMNT PAIN NOTED PAIN PRSNT: CPT | Performed by: INTERNAL MEDICINE

## 2024-10-17 RX ORDER — FLUTICASONE PROPIONATE 50 MCG
1 SPRAY, SUSPENSION (ML) NASAL DAILY
Qty: 16 G | Refills: 5 | Status: SHIPPED | OUTPATIENT
Start: 2024-10-17 | End: 2025-10-17

## 2024-10-17 ASSESSMENT — PAIN SCALES - GENERAL: PAINLEVEL_OUTOF10: 9

## 2024-10-17 ASSESSMENT — ENCOUNTER SYMPTOMS
LOSS OF SENSATION IN FEET: 0
DEPRESSION: 0
OCCASIONAL FEELINGS OF UNSTEADINESS: 0

## 2024-10-17 ASSESSMENT — PATIENT HEALTH QUESTIONNAIRE - PHQ9
SUM OF ALL RESPONSES TO PHQ9 QUESTIONS 1 AND 2: 0
1. LITTLE INTEREST OR PLEASURE IN DOING THINGS: NOT AT ALL
2. FEELING DOWN, DEPRESSED OR HOPELESS: NOT AT ALL

## 2024-10-17 NOTE — PROGRESS NOTES
Subjective   Patient ID: Kena Bolaños is a 84 y.o. female who presents for Earache (Left ear).    HPI     Complaining of left earache since yesterday morning. Stated pain is 9/10. Denied any fever, chills, runny nose, cough.   Has mild nasal congestion, denied any sinus pressure  Gets earaches couple of times a year, it is always left ear    Right leg swelling and tenderness for over a month.  Denied any worsening of swelling in her leg or shortness of breath    Review of Systems   Constitutional:  Negative for chills, fatigue and unexpected weight change.   HENT:  Positive for ear pain. Negative for postnasal drip, sinus pressure and trouble swallowing.    Respiratory:  Negative for cough, shortness of breath and wheezing.    Cardiovascular:  Positive for leg swelling (losalised). Negative for chest pain and palpitations.   Gastrointestinal:  Negative for abdominal pain, blood in stool, nausea and vomiting.   Endocrine: Negative for polydipsia, polyphagia and polyuria.   Genitourinary:  Negative for dysuria and frequency.   Musculoskeletal:  Positive for arthralgias. Negative for back pain and myalgias.   Skin:  Negative for rash.   Neurological:  Negative for tremors, seizures and numbness.   Psychiatric/Behavioral:  Negative for behavioral problems.        Objective   /58 (BP Location: Left arm, Patient Position: Sitting, BP Cuff Size: Adult)   Pulse 77   Temp 36.3 °C (97.3 °F) (Temporal)   Wt 59.9 kg (132 lb)   SpO2 99%   BMI 23.38 kg/m²     Physical Exam  Constitutional:       General: She is not in acute distress.     Appearance: Normal appearance.   HENT:      Head: Normocephalic and atraumatic.      Right Ear: Tympanic membrane normal.      Left Ear: Tympanic membrane normal.   Eyes:      Extraocular Movements: Extraocular movements intact.      Pupils: Pupils are equal, round, and reactive to light.   Cardiovascular:      Rate and Rhythm: Normal rate and regular rhythm.      Heart sounds:  Normal heart sounds. No murmur heard.     No friction rub.   Pulmonary:      Effort: Pulmonary effort is normal.      Breath sounds: Normal breath sounds. No wheezing or rales.   Abdominal:      General: Bowel sounds are normal.      Palpations: Abdomen is soft.      Tenderness: There is no abdominal tenderness. There is no guarding.   Musculoskeletal:         General: Normal range of motion.      Cervical back: Normal range of motion and neck supple.      Left lower leg: No edema.      Comments: Nodular swelling near left calf medially, mild tenderness on palpation   Lymphadenopathy:      Cervical: No cervical adenopathy.   Skin:     General: Skin is warm and dry.      Findings: No rash.   Neurological:      General: No focal deficit present.      Mental Status: She is alert and oriented to person, place, and time.      Cranial Nerves: No cranial nerve deficit.   Psychiatric:         Mood and Affect: Mood normal.         Assessment/Plan        Kena was seen today for earache.  Diagnoses and all orders for this visit:  Nasal congestion (Primary)  -     fluticasone (Flonase) 50 mcg/actuation nasal spray; Administer 1 spray into each nostril once daily. Shake gently. Before first use, prime pump. After use, clean tip and replace cap.  Left ear pain  Pain and swelling of right lower leg  -     US nonvascular extremity US extremity nonvascular real time w image documentation complete; Future      Advised no antibiotics needed as here examination is normal  Take Tylenol or ibuprofen as needed      Current Outpatient Medications   Medication Instructions    acetaminophen (TylenoL) 325 mg tablet 2 tablets    amLODIPine (NORVASC) 2.5 mg, oral, Nightly    atorvastatin (Lipitor) 80 mg tablet 1 tablet, Daily    cyanocobalamin (Vitamin B-12) 1,000 mcg tablet 1 tablet, Daily    denosumab (PROLIA) 60 mg, subcutaneous, Every 6 months    diclofenac epolamine 1.3 % patch 2 times daily    docusate sodium (Colace) 100 mg capsule 1  capsule, 2 times daily PRN    fluticasone (Flonase) 50 mcg/actuation nasal spray 1 spray, Each Nostril, Daily, Shake gently. Before first use, prime pump. After use, clean tip and replace cap.    hydroxychloroquine (Plaquenil) 200 mg tablet oral, Daily    levothyroxine (Synthroid, Levoxyl) 75 mcg tablet TAKE 1 TABLET BY MOUTH IN THE MORNING ON AN EMPTY STOMACH MONDAY-SATURDAY    methyl salicylate/menth/camph (SALONPAS TOP) 2 times weekly    metoprolol tartrate (Lopressor) 50 mg tablet 1 tablet, 2 times daily (morning and late afternoon)    nitroglycerin (Nitrostat) 0.4 mg SL tablet Place under the tongue. PLACE 1 TABLET UNDER THE TONGUE EVERY 5 MINUTES FOR UP TO 3 DOSES AS NEEDED FOR CHEST PAIN.CALL 911 IF PAIN PERSISTS.    omeprazole (PriLOSEC) 40 mg DR capsule 1 capsule, Daily with evening meal    pantoprazole (ProtoNix) 40 mg EC tablet 1 tablet, Daily    sevelamer carbonate (RENVELA) 800 mg, 2 times daily    sodium bicarbonate 650 mg, oral, 2 times daily    tetrahydroz/dext 70/peg 400/pv (EYE DROPS ADVANCED RELIEF OPHT) Eye Drops Relief

## 2024-10-18 ASSESSMENT — ENCOUNTER SYMPTOMS
POLYPHAGIA: 0
BACK PAIN: 0
POLYDIPSIA: 0
FREQUENCY: 0
TROUBLE SWALLOWING: 0
FATIGUE: 0
VOMITING: 0
ABDOMINAL PAIN: 0
CHILLS: 0
NAUSEA: 0
NUMBNESS: 0
TREMORS: 0
MYALGIAS: 0
ARTHRALGIAS: 1
COUGH: 0
SINUS PRESSURE: 0
SEIZURES: 0
PALPITATIONS: 0
UNEXPECTED WEIGHT CHANGE: 0
DYSURIA: 0
BLOOD IN STOOL: 0
SHORTNESS OF BREATH: 0
WHEEZING: 0

## 2024-10-28 ENCOUNTER — HOSPITAL ENCOUNTER (OUTPATIENT)
Dept: RADIOLOGY | Facility: CLINIC | Age: 84
Discharge: HOME | End: 2024-10-28
Payer: MEDICARE

## 2024-10-28 DIAGNOSIS — M79.89 PAIN AND SWELLING OF RIGHT LOWER LEG: ICD-10-CM

## 2024-10-28 DIAGNOSIS — M79.661 PAIN AND SWELLING OF RIGHT LOWER LEG: ICD-10-CM

## 2024-10-28 PROCEDURE — 93971 EXTREMITY STUDY: CPT | Mod: RT

## 2024-10-28 PROCEDURE — 93971 EXTREMITY STUDY: CPT | Mod: RIGHT SIDE | Performed by: RADIOLOGY

## 2024-11-14 DIAGNOSIS — I10 ESSENTIAL HYPERTENSION: Primary | ICD-10-CM

## 2024-11-14 DIAGNOSIS — E78.5 DYSLIPIDEMIA: ICD-10-CM

## 2024-11-14 RX ORDER — ATORVASTATIN CALCIUM 80 MG/1
80 TABLET, FILM COATED ORAL DAILY
Qty: 90 TABLET | Refills: 0 | Status: SHIPPED | OUTPATIENT
Start: 2024-11-14 | End: 2025-02-12

## 2024-11-14 RX ORDER — METOPROLOL TARTRATE 50 MG/1
50 TABLET ORAL
Qty: 180 TABLET | Refills: 0 | Status: SHIPPED | OUTPATIENT
Start: 2024-11-14 | End: 2025-02-12

## 2024-11-15 ENCOUNTER — HOSPITAL ENCOUNTER (OUTPATIENT)
Dept: RADIOLOGY | Facility: HOSPITAL | Age: 84
Discharge: HOME | End: 2024-11-15
Payer: MEDICARE

## 2024-11-15 VITALS — HEIGHT: 63 IN | WEIGHT: 130 LBS | BODY MASS INDEX: 23.04 KG/M2

## 2024-11-15 DIAGNOSIS — Z12.31 SCREENING MAMMOGRAM FOR BREAST CANCER: ICD-10-CM

## 2024-11-15 PROCEDURE — 77067 SCR MAMMO BI INCL CAD: CPT

## 2024-11-18 ENCOUNTER — LAB (OUTPATIENT)
Dept: LAB | Facility: LAB | Age: 84
End: 2024-11-18
Payer: MEDICARE

## 2024-11-18 DIAGNOSIS — N18.4 CHRONIC KIDNEY DISEASE, STAGE 4 (SEVERE) (MULTI): Primary | ICD-10-CM

## 2024-11-18 DIAGNOSIS — I10 ESSENTIAL HYPERTENSION: ICD-10-CM

## 2024-11-18 DIAGNOSIS — E78.5 DYSLIPIDEMIA: ICD-10-CM

## 2024-11-18 LAB
ALBUMIN SERPL BCP-MCNC: 4 G/DL (ref 3.4–5)
ANION GAP SERPL CALCULATED.3IONS-SCNC: 11 MMOL/L (ref 10–20)
BUN SERPL-MCNC: 34 MG/DL (ref 6–23)
CALCIUM SERPL-MCNC: 9.2 MG/DL (ref 8.6–10.3)
CHLORIDE SERPL-SCNC: 106 MMOL/L (ref 98–107)
CO2 SERPL-SCNC: 26 MMOL/L (ref 21–32)
CREAT SERPL-MCNC: 2 MG/DL (ref 0.5–1.05)
EGFRCR SERPLBLD CKD-EPI 2021: 24 ML/MIN/1.73M*2
ERYTHROCYTE [DISTWIDTH] IN BLOOD BY AUTOMATED COUNT: 13.4 % (ref 11.5–14.5)
GLUCOSE SERPL-MCNC: 103 MG/DL (ref 74–99)
HCT VFR BLD AUTO: 31.4 % (ref 36–46)
HGB BLD-MCNC: 10.5 G/DL (ref 12–16)
MCH RBC QN AUTO: 29.6 PG (ref 26–34)
MCHC RBC AUTO-ENTMCNC: 33.4 G/DL (ref 32–36)
MCV RBC AUTO: 89 FL (ref 80–100)
NRBC BLD-RTO: 0 /100 WBCS (ref 0–0)
PHOSPHATE SERPL-MCNC: 5.2 MG/DL (ref 2.5–4.9)
PLATELET # BLD AUTO: 303 X10*3/UL (ref 150–450)
POTASSIUM SERPL-SCNC: 4.3 MMOL/L (ref 3.5–5.3)
PTH-INTACT SERPL-MCNC: 59.5 PG/ML (ref 18.5–88)
RBC # BLD AUTO: 3.55 X10*6/UL (ref 4–5.2)
SODIUM SERPL-SCNC: 139 MMOL/L (ref 136–145)
WBC # BLD AUTO: 6.7 X10*3/UL (ref 4.4–11.3)

## 2024-11-18 PROCEDURE — 36415 COLL VENOUS BLD VENIPUNCTURE: CPT

## 2024-11-18 PROCEDURE — 83970 ASSAY OF PARATHORMONE: CPT

## 2024-11-18 PROCEDURE — 80069 RENAL FUNCTION PANEL: CPT

## 2024-11-18 PROCEDURE — 85027 COMPLETE CBC AUTOMATED: CPT

## 2024-11-18 RX ORDER — ATORVASTATIN CALCIUM 20 MG/1
20 TABLET, FILM COATED ORAL DAILY
Qty: 90 TABLET | Refills: 0 | Status: SHIPPED | OUTPATIENT
Start: 2024-11-18 | End: 2025-02-16

## 2024-11-18 RX ORDER — METOPROLOL TARTRATE 50 MG/1
50 TABLET ORAL
Qty: 180 TABLET | Refills: 0 | Status: SHIPPED | OUTPATIENT
Start: 2024-11-18 | End: 2025-02-16

## 2024-12-17 ENCOUNTER — OFFICE VISIT (OUTPATIENT)
Dept: CARDIOLOGY | Facility: CLINIC | Age: 84
End: 2024-12-17
Payer: MEDICARE

## 2024-12-17 VITALS
BODY MASS INDEX: 24.09 KG/M2 | WEIGHT: 136 LBS | DIASTOLIC BLOOD PRESSURE: 80 MMHG | SYSTOLIC BLOOD PRESSURE: 142 MMHG | OXYGEN SATURATION: 100 % | HEART RATE: 76 BPM

## 2024-12-17 DIAGNOSIS — I25.10 ATHEROSCLEROSIS OF NATIVE CORONARY ARTERY OF NATIVE HEART WITHOUT ANGINA PECTORIS: ICD-10-CM

## 2024-12-17 DIAGNOSIS — E78.5 DYSLIPIDEMIA: ICD-10-CM

## 2024-12-17 DIAGNOSIS — I10 ESSENTIAL HYPERTENSION: Primary | ICD-10-CM

## 2024-12-17 PROCEDURE — 99204 OFFICE O/P NEW MOD 45 MIN: CPT | Performed by: INTERNAL MEDICINE

## 2024-12-17 PROCEDURE — 1126F AMNT PAIN NOTED NONE PRSNT: CPT | Performed by: INTERNAL MEDICINE

## 2024-12-17 PROCEDURE — 3077F SYST BP >= 140 MM HG: CPT | Performed by: INTERNAL MEDICINE

## 2024-12-17 PROCEDURE — 99214 OFFICE O/P EST MOD 30 MIN: CPT | Mod: 25 | Performed by: INTERNAL MEDICINE

## 2024-12-17 PROCEDURE — 3079F DIAST BP 80-89 MM HG: CPT | Performed by: INTERNAL MEDICINE

## 2024-12-17 PROCEDURE — 1157F ADVNC CARE PLAN IN RCRD: CPT | Performed by: INTERNAL MEDICINE

## 2024-12-17 PROCEDURE — G2211 COMPLEX E/M VISIT ADD ON: HCPCS | Performed by: INTERNAL MEDICINE

## 2024-12-17 PROCEDURE — 1036F TOBACCO NON-USER: CPT | Performed by: INTERNAL MEDICINE

## 2024-12-17 PROCEDURE — 93010 ELECTROCARDIOGRAM REPORT: CPT | Performed by: INTERNAL MEDICINE

## 2024-12-17 PROCEDURE — 1159F MED LIST DOCD IN RCRD: CPT | Performed by: INTERNAL MEDICINE

## 2024-12-17 PROCEDURE — 93005 ELECTROCARDIOGRAM TRACING: CPT | Performed by: INTERNAL MEDICINE

## 2024-12-17 RX ORDER — ATORVASTATIN CALCIUM 20 MG/1
20 TABLET, FILM COATED ORAL DAILY
Qty: 90 TABLET | Refills: 3 | Status: SHIPPED | OUTPATIENT
Start: 2024-12-17 | End: 2025-12-17

## 2024-12-17 RX ORDER — METOPROLOL TARTRATE 50 MG/1
50 TABLET ORAL 2 TIMES DAILY
Qty: 180 TABLET | Refills: 3 | Status: SHIPPED | OUTPATIENT
Start: 2024-12-17 | End: 2025-12-17

## 2024-12-17 RX ORDER — AMLODIPINE BESYLATE 5 MG/1
5 TABLET ORAL DAILY
Qty: 90 TABLET | Refills: 3 | Status: SHIPPED | OUTPATIENT
Start: 2024-12-17 | End: 2025-12-17

## 2024-12-17 ASSESSMENT — ENCOUNTER SYMPTOMS
MYALGIAS: 0
WEIGHT LOSS: 0
DIAPHORESIS: 0
LOSS OF SENSATION IN FEET: 0
IRREGULAR HEARTBEAT: 0
CLAUDICATION: 0
DEPRESSION: 0
COUGH: 0
PALPITATIONS: 0
PND: 0
WEIGHT GAIN: 0
WEAKNESS: 0
FEVER: 0
OCCASIONAL FEELINGS OF UNSTEADINESS: 1
WHEEZING: 0
DIZZINESS: 0
SYNCOPE: 0
NEAR-SYNCOPE: 0
SHORTNESS OF BREATH: 0
DYSPNEA ON EXERTION: 0
ORTHOPNEA: 0

## 2024-12-17 ASSESSMENT — PATIENT HEALTH QUESTIONNAIRE - PHQ9
1. LITTLE INTEREST OR PLEASURE IN DOING THINGS: NOT AT ALL
2. FEELING DOWN, DEPRESSED OR HOPELESS: NOT AT ALL
SUM OF ALL RESPONSES TO PHQ9 QUESTIONS 1 AND 2: 0

## 2024-12-17 ASSESSMENT — PAIN SCALES - GENERAL: PAINLEVEL_OUTOF10: 0-NO PAIN

## 2024-12-17 NOTE — PROGRESS NOTES
Subjective      Chief Complaint   Patient presents with    dr rouse patient        84-year-old female with history of atherosclerotic heart disease presents for cardiac evaluation.  She is a former patient of Dr. Grimes.  She has a history of cardiac catheterization in 2016 with 2 drug-eluting stents placed in her LAD.  She is to follow with Dr. Bhatti, and was last seen in 2023.  She also has chronic kidney disease managed by Dr. Talbot. She is active, swims and does steps without limitations.           Review of Systems   Constitutional: Negative for diaphoresis, fever, weight gain and weight loss.   Eyes:  Negative for visual disturbance.   Cardiovascular:  Negative for chest pain, claudication, dyspnea on exertion, irregular heartbeat, leg swelling, near-syncope, orthopnea, palpitations, paroxysmal nocturnal dyspnea and syncope.   Respiratory:  Negative for cough, shortness of breath and wheezing.    Musculoskeletal:  Negative for muscle weakness and myalgias.   Neurological:  Negative for dizziness and weakness.   All other systems reviewed and are negative.       Past Medical History:   Diagnosis Date    Acute poliomyelitis, unspecified 2015    Polio    Atherosclerotic heart disease of native coronary artery without angina pectoris 2023    Echo EF 50-55 Apical hypokinesis  ./    Raygoza's esophagus without dysplasia 2015    Raygoza's esophagus    Chronic renal disease, stage IV (Multi) 2023        Dry mouth     Post-polio syndrome (CMS-HCC) 2024    Right foot    Rheumatoid arthritis with rheumatoid factor 2023    Dr. Dena Soto    Temporomandibular joint syndrome 2023        Past Surgical History:   Procedure Laterality Date    APPENDECTOMY      BREAST RECONSTRUCTION Bilateral          SECTION, CLASSIC  2015     Section    CHOLECYSTECTOMY  2015    Cholecystectomy    CT GUIDED IMAGING FOR NEEDLE PLACEMENT   11/06/2018    CT GUIDED IMAGING FOR NEEDLE PLACEMENT [facility]    FOOT SURGERY  03/30/2015    Foot Surgery right '16 screw Dr.Dillar Rosado        Social History     Socioeconomic History    Marital status:      Spouse name: Not on file    Number of children: Not on file    Years of education: Not on file    Highest education level: Not on file   Occupational History    Not on file   Tobacco Use    Smoking status: Never     Passive exposure: Never    Smokeless tobacco: Never   Vaping Use    Vaping status: Never Used   Substance and Sexual Activity    Alcohol use: Yes    Drug use: Never    Sexual activity: Defer   Other Topics Concern    Not on file   Social History Narrative    Not on file     Social Drivers of Health     Financial Resource Strain: Not on file   Food Insecurity: Not on file   Transportation Needs: Not on file   Physical Activity: Not on file   Stress: Not on file   Social Connections: Not on file   Intimate Partner Violence: Not on file   Housing Stability: Not on file        Family History   Problem Relation Name Age of Onset    Diabetes Mother      Colon cancer Mother      Diabetes Maternal Grandmother      Diabetes Maternal Grandfather      Pancreatic cancer Sibling          OBJECTIVE:    Vitals:    12/17/24 1039   BP: 142/80   Pulse: 76   SpO2: 100%        Vitals reviewed.   Constitutional:       Appearance: Normal and healthy appearance. Not in distress.   Pulmonary:      Effort: Pulmonary effort is normal.      Breath sounds: Normal breath sounds.   Cardiovascular:      Normal rate. Regular rhythm. Normal S1. Normal S2.       Murmurs: There is no murmur.      No gallop.  No click.   Pulses:     Intact distal pulses.   Edema:     Peripheral edema absent.   Skin:     General: Skin is warm and dry.   Neurological:      General: No focal deficit present.          Lab Review:   Lab Results   Component Value Date     11/18/2024    K 4.3 11/18/2024     11/18/2024     CO2 26 11/18/2024    BUN 34 (H) 11/18/2024    CREATININE 2.00 (H) 11/18/2024    GLUCOSE 103 (H) 11/18/2024    CALCIUM 9.2 11/18/2024     Lab Results   Component Value Date    CHOL 144 11/17/2023    TRIG 77 11/17/2023    HDL 72.0 11/17/2023       Lab Results   Component Value Date    LDLCALC 57 (L) 11/17/2023        Atherosclerotic heart disease of native coronary artery without angina pectoris  Stable without angina. Continue asa and statin. Checking lipids    Dyslipidemia  Checking lipids, goal LDL is <70    Essential hypertension  Uncontrolled. Asked to take home readings. Discussed salt restriction. She is compliant w exercise. No changes today

## 2024-12-17 NOTE — ASSESSMENT & PLAN NOTE
Uncontrolled. Asked to take home readings. Discussed salt restriction. She is compliant w exercise. No changes today

## 2024-12-23 ENCOUNTER — LAB (OUTPATIENT)
Dept: LAB | Facility: LAB | Age: 84
End: 2024-12-23
Payer: MEDICARE

## 2024-12-23 DIAGNOSIS — I25.10 ATHEROSCLEROSIS OF NATIVE CORONARY ARTERY OF NATIVE HEART WITHOUT ANGINA PECTORIS: ICD-10-CM

## 2024-12-23 LAB
CHOLEST SERPL-MCNC: 136 MG/DL (ref 0–199)
CHOLEST/HDLC SERPL: 2.1 {RATIO}
HDLC SERPL-MCNC: 63.5 MG/DL
LDLC SERPL CALC-MCNC: 53 MG/DL
NON HDL CHOLESTEROL: 73 MG/DL (ref 0–149)
TRIGL SERPL-MCNC: 99 MG/DL (ref 0–149)
VLDL: 20 MG/DL (ref 0–40)

## 2024-12-23 PROCEDURE — 80061 LIPID PANEL: CPT

## 2025-01-20 DIAGNOSIS — M81.0 OSTEOPOROSIS, SENILE: Primary | ICD-10-CM

## 2025-02-03 ENCOUNTER — OFFICE VISIT (OUTPATIENT)
Dept: PRIMARY CARE | Facility: CLINIC | Age: 85
End: 2025-02-03
Payer: MEDICARE

## 2025-02-03 VITALS
TEMPERATURE: 97.5 F | DIASTOLIC BLOOD PRESSURE: 72 MMHG | WEIGHT: 133 LBS | HEIGHT: 63 IN | OXYGEN SATURATION: 98 % | SYSTOLIC BLOOD PRESSURE: 120 MMHG | HEART RATE: 80 BPM | BODY MASS INDEX: 23.57 KG/M2

## 2025-02-03 DIAGNOSIS — Z00.00 MEDICARE ANNUAL WELLNESS VISIT, SUBSEQUENT: Primary | ICD-10-CM

## 2025-02-03 DIAGNOSIS — I10 ESSENTIAL HYPERTENSION: ICD-10-CM

## 2025-02-03 DIAGNOSIS — N18.4 CHRONIC KIDNEY DISEASE (CKD), STAGE IV (SEVERE) (MULTI): ICD-10-CM

## 2025-02-03 DIAGNOSIS — E78.5 DYSLIPIDEMIA: ICD-10-CM

## 2025-02-03 DIAGNOSIS — E03.9 ACQUIRED HYPOTHYROIDISM: ICD-10-CM

## 2025-02-03 DIAGNOSIS — E55.9 VITAMIN D DEFICIENCY: ICD-10-CM

## 2025-02-03 DIAGNOSIS — R73.9 HYPERGLYCEMIA: ICD-10-CM

## 2025-02-03 DIAGNOSIS — G70.00 MYASTHENIA GRAVIS: ICD-10-CM

## 2025-02-03 PROCEDURE — 1159F MED LIST DOCD IN RCRD: CPT | Performed by: INTERNAL MEDICINE

## 2025-02-03 PROCEDURE — 99215 OFFICE O/P EST HI 40 MIN: CPT | Performed by: INTERNAL MEDICINE

## 2025-02-03 PROCEDURE — 1157F ADVNC CARE PLAN IN RCRD: CPT | Performed by: INTERNAL MEDICINE

## 2025-02-03 PROCEDURE — 1125F AMNT PAIN NOTED PAIN PRSNT: CPT | Performed by: INTERNAL MEDICINE

## 2025-02-03 PROCEDURE — 99397 PER PM REEVAL EST PAT 65+ YR: CPT | Performed by: INTERNAL MEDICINE

## 2025-02-03 PROCEDURE — 99397 PER PM REEVAL EST PAT 65+ YR: CPT | Mod: 25 | Performed by: INTERNAL MEDICINE

## 2025-02-03 PROCEDURE — 3078F DIAST BP <80 MM HG: CPT | Performed by: INTERNAL MEDICINE

## 2025-02-03 PROCEDURE — 3074F SYST BP LT 130 MM HG: CPT | Performed by: INTERNAL MEDICINE

## 2025-02-03 PROCEDURE — G0439 PPPS, SUBSEQ VISIT: HCPCS | Performed by: INTERNAL MEDICINE

## 2025-02-03 ASSESSMENT — ENCOUNTER SYMPTOMS
POLYDIPSIA: 0
CHILLS: 0
PALPITATIONS: 0
WHEEZING: 0
VOMITING: 0
FREQUENCY: 0
DYSURIA: 0
OCCASIONAL FEELINGS OF UNSTEADINESS: 0
ARTHRALGIAS: 1
COUGH: 0
SHORTNESS OF BREATH: 0
UNEXPECTED WEIGHT CHANGE: 0
DEPRESSION: 0
SEIZURES: 0
NAUSEA: 0
LOSS OF SENSATION IN FEET: 0
BACK PAIN: 0
TROUBLE SWALLOWING: 0
MYALGIAS: 0
FATIGUE: 0
SINUS PRESSURE: 0
ABDOMINAL PAIN: 0
BLOOD IN STOOL: 0
POLYPHAGIA: 0
NUMBNESS: 0
TREMORS: 0

## 2025-02-03 ASSESSMENT — COLUMBIA-SUICIDE SEVERITY RATING SCALE - C-SSRS
6. HAVE YOU EVER DONE ANYTHING, STARTED TO DO ANYTHING, OR PREPARED TO DO ANYTHING TO END YOUR LIFE?: NO
1. IN THE PAST MONTH, HAVE YOU WISHED YOU WERE DEAD OR WISHED YOU COULD GO TO SLEEP AND NOT WAKE UP?: NO
2. HAVE YOU ACTUALLY HAD ANY THOUGHTS OF KILLING YOURSELF?: NO

## 2025-02-03 ASSESSMENT — PAIN SCALES - GENERAL: PAINLEVEL_OUTOF10: 3

## 2025-02-03 NOTE — PROGRESS NOTES
"Subjective   Patient ID: Kena Bolaños is a 84 y.o. female who presents for Annual Exam (Sinus congestion, runny eyes, dry mouth 3 weeks/Pt would like to know if she is supposed to be taking vitamin D).    HPI     Has history of rheumatoid arthritis, CKD stage IV, anemia, coronary artery disease, hypertension, hypothyroidism, postpolio syndrome  History of rheumatoid arthritis -seeing Dr. Ramos  H/O osteoporosis, being managed by Dr Ramos-  on Prolia injections  History of CKD, follows with Dr. Talbot  Fatigue early this year, better since last few months  H/O CAD- seeing Dr Velasquez Bhatti  Very active, does water aerobics, 2-3 times a week  Post polio syndrome, had multiple surgeries on right ankle, wore a brace on right leg till she turned 5  Has occasional pain in right ankle    Runny nose siince last 3 weeks, deneid any fever, chills, nausea, shortness of rbeath    Review of Systems   Constitutional:  Negative for chills, fatigue and unexpected weight change.   HENT:  Negative for postnasal drip, sinus pressure and trouble swallowing.    Respiratory:  Negative for cough, shortness of breath and wheezing.    Cardiovascular:  Negative for chest pain, palpitations and leg swelling.   Gastrointestinal:  Negative for abdominal pain, blood in stool, nausea and vomiting.   Endocrine: Negative for polydipsia, polyphagia and polyuria.   Genitourinary:  Negative for dysuria and frequency.   Musculoskeletal:  Positive for arthralgias. Negative for back pain and myalgias.   Skin:  Negative for rash.   Neurological:  Negative for tremors, seizures and numbness.   Psychiatric/Behavioral:  Negative for behavioral problems.        Objective   /72 (BP Location: Left arm, Patient Position: Sitting, BP Cuff Size: Small adult)   Pulse 80   Temp 36.4 °C (97.5 °F) (Temporal)   Ht 1.6 m (5' 3\")   Wt 60.3 kg (133 lb)   SpO2 98%   BMI 23.56 kg/m²     Physical Exam  Constitutional:       General: She is not in acute " distress.     Appearance: Normal appearance.   HENT:      Head: Normocephalic and atraumatic.   Eyes:      Extraocular Movements: Extraocular movements intact.      Pupils: Pupils are equal, round, and reactive to light.   Cardiovascular:      Rate and Rhythm: Normal rate and regular rhythm.      Heart sounds: Normal heart sounds. No murmur heard.     No friction rub.   Pulmonary:      Effort: Pulmonary effort is normal.      Breath sounds: Normal breath sounds. No wheezing or rales.   Abdominal:      General: Bowel sounds are normal.      Palpations: Abdomen is soft.      Tenderness: There is no abdominal tenderness. There is no guarding.   Musculoskeletal:         General: Normal range of motion.      Cervical back: Normal range of motion and neck supple.      Right lower leg: No edema.      Left lower leg: No edema.   Lymphadenopathy:      Cervical: No cervical adenopathy.   Skin:     General: Skin is warm and dry.      Findings: No rash.   Neurological:      General: No focal deficit present.      Mental Status: She is alert and oriented to person, place, and time.      Cranial Nerves: No cranial nerve deficit.   Psychiatric:         Mood and Affect: Mood normal.         Assessment/Plan        Kena was seen today for annual exam.  Diagnoses and all orders for this visit:  Medicare annual wellness visit, subsequent (Primary)  Essential hypertension  Acquired hypothyroidism  -     TSH with reflex to Free T4 if abnormal; Future  Dyslipidemia  Vitamin D deficiency  -     Vitamin D 25-Hydroxy,Total (for eval of Vitamin D levels); Future  Hyperglycemia  -     Hemoglobin A1C; Future      Due for bone density scan, she is going to discuss with her rheumatologist who manages her osteoporosis  Advised weightbearing exercises couple of times a week    Lab Results   Component Value Date    WBC 6.7 11/18/2024    HGB 10.5 (L) 11/18/2024    HCT 31.4 (L) 11/18/2024    MCV 89 11/18/2024     11/18/2024     Lab Results    Component Value Date    GLUCOSE 103 (H) 11/18/2024    CALCIUM 9.2 11/18/2024     11/18/2024    K 4.3 11/18/2024    CO2 26 11/18/2024     11/18/2024    BUN 34 (H) 11/18/2024    CREATININE 2.00 (H) 11/18/2024       Current Outpatient Medications   Medication Instructions    acetaminophen (TylenoL) 325 mg tablet 2 tablets    amLODIPine (NORVASC) 5 mg, oral, Daily    atorvastatin (LIPITOR) 20 mg, oral, Daily    cyanocobalamin (Vitamin B-12) 1,000 mcg tablet 1 tablet, Daily    denosumab (PROLIA) 60 mg, subcutaneous, Every 6 months    diclofenac epolamine 1.3 % patch 2 times daily    docusate sodium (Colace) 100 mg capsule 1 capsule, 2 times daily PRN    fluticasone (Flonase) 50 mcg/actuation nasal spray 1 spray, Each Nostril, Daily, Shake gently. Before first use, prime pump. After use, clean tip and replace cap.    hydroxychloroquine (Plaquenil) 200 mg tablet oral, Daily    levothyroxine (Synthroid, Levoxyl) 75 mcg tablet TAKE 1 TABLET BY MOUTH IN THE MORNING ON AN EMPTY STOMACH MONDAY-SATURDAY    methyl salicylate/menth/camph (SALONPAS TOP) 2 times weekly    metoprolol tartrate (LOPRESSOR) 50 mg, oral, 2 times daily (morning and late afternoon)    nitroglycerin (Nitrostat) 0.4 mg SL tablet Place under the tongue. PLACE 1 TABLET UNDER THE TONGUE EVERY 5 MINUTES FOR UP TO 3 DOSES AS NEEDED FOR CHEST PAIN.CALL 911 IF PAIN PERSISTS.    pantoprazole (ProtoNix) 40 mg EC tablet 1 tablet, Daily    sodium bicarbonate 650 mg, oral, 2 times daily    tetrahydroz/dext 70/peg 400/pv (EYE DROPS ADVANCED RELIEF OPHT) Eye Drops Relief

## 2025-02-10 ENCOUNTER — OFFICE VISIT (OUTPATIENT)
Dept: RHEUMATOLOGY | Facility: CLINIC | Age: 85
End: 2025-02-10
Payer: MEDICARE

## 2025-02-10 VITALS — DIASTOLIC BLOOD PRESSURE: 62 MMHG | WEIGHT: 135 LBS | SYSTOLIC BLOOD PRESSURE: 106 MMHG | BODY MASS INDEX: 23.91 KG/M2

## 2025-02-10 DIAGNOSIS — M25.561 RIGHT KNEE PAIN, UNSPECIFIED CHRONICITY: ICD-10-CM

## 2025-02-10 DIAGNOSIS — M19.90 OSTEOARTHRITIS, UNSPECIFIED OSTEOARTHRITIS TYPE, UNSPECIFIED SITE: ICD-10-CM

## 2025-02-10 DIAGNOSIS — M05.9 RHEUMATOID ARTHRITIS WITH POSITIVE RHEUMATOID FACTOR, INVOLVING UNSPECIFIED SITE (MULTI): Primary | ICD-10-CM

## 2025-02-10 DIAGNOSIS — M81.0 OSTEOPOROSIS, SENILE: ICD-10-CM

## 2025-02-10 PROCEDURE — 99214 OFFICE O/P EST MOD 30 MIN: CPT | Mod: 25 | Performed by: INTERNAL MEDICINE

## 2025-02-10 PROCEDURE — 3074F SYST BP LT 130 MM HG: CPT | Performed by: INTERNAL MEDICINE

## 2025-02-10 PROCEDURE — 20610 DRAIN/INJ JOINT/BURSA W/O US: CPT | Mod: RT | Performed by: INTERNAL MEDICINE

## 2025-02-10 PROCEDURE — 1157F ADVNC CARE PLAN IN RCRD: CPT | Performed by: INTERNAL MEDICINE

## 2025-02-10 PROCEDURE — 1159F MED LIST DOCD IN RCRD: CPT | Performed by: INTERNAL MEDICINE

## 2025-02-10 PROCEDURE — 2500000004 HC RX 250 GENERAL PHARMACY W/ HCPCS (ALT 636 FOR OP/ED): Performed by: INTERNAL MEDICINE

## 2025-02-10 PROCEDURE — 99214 OFFICE O/P EST MOD 30 MIN: CPT | Performed by: INTERNAL MEDICINE

## 2025-02-10 PROCEDURE — 3078F DIAST BP <80 MM HG: CPT | Performed by: INTERNAL MEDICINE

## 2025-02-10 RX ORDER — TRIAMCINOLONE ACETONIDE 40 MG/ML
40 INJECTION, SUSPENSION INTRA-ARTICULAR; INTRAMUSCULAR
Status: COMPLETED | OUTPATIENT
Start: 2025-02-10 | End: 2025-02-10

## 2025-02-10 RX ADMIN — TRIAMCINOLONE ACETONIDE 40 MG: 40 INJECTION, SUSPENSION INTRA-ARTICULAR; INTRAMUSCULAR at 15:41

## 2025-02-10 NOTE — PROGRESS NOTES
"Recheck  OA  /  RA  /  OP  ( Due for Prolia - March - Tript )  C/O increased left leg neuropathy, right knee pain.  ? Injection today    HPI - She said that the R knee pain \"never really stopped\" and has been bad for 6 wks.  She then said the injection did help.  Her L leg hurts - she has neuropathy.  She has burning pain.  She has seen pain mgmt in the past.  She couln't take lyrica but can't remember why she couldn't take it.  Gabapentin \"knocked me out\"  She has \"general aches and pains with my age.\"  No swelling.  Am stiffness 1/2 hr.  No CP or resp.  She has a lot of GERD and saw GI and on PPI.  Sees eye dr burton    PE  NAD  RRR no r/m/g  CTA  No edema  No synovitis  R knee tender and pain with ROM    A/P - OA and RA, recurrent R knee pain and LLE neuropathic pain  Recommend back to pain mgmt  R knee injection - expl risks/benefits.  Pt gave written consent.  Aseptic tech, injected with 40mg kenalog and 1ml 1% lido  OP - on prolia - check labs prior  Follow up 6 mo or sooner PRN    Patient ID: Kena Bolaños is a 84 y.o. female.    Large Joint Injection/Arthrocentesis: R knee on 2/10/2025 3:41 PM  Indications: pain  Medications: 40 mg triamcinolone acetonide 40 mg/mL  Procedure, treatment alternatives, risks and benefits explained, specific risks discussed. Consent was given by the patient.         "

## 2025-02-12 ENCOUNTER — APPOINTMENT (OUTPATIENT)
Dept: RADIOLOGY | Facility: HOSPITAL | Age: 85
End: 2025-02-12
Payer: MEDICARE

## 2025-02-12 ENCOUNTER — HOSPITAL ENCOUNTER (EMERGENCY)
Facility: HOSPITAL | Age: 85
Discharge: HOME | End: 2025-02-13
Attending: EMERGENCY MEDICINE
Payer: MEDICARE

## 2025-02-12 ENCOUNTER — APPOINTMENT (OUTPATIENT)
Dept: CARDIOLOGY | Facility: HOSPITAL | Age: 85
End: 2025-02-12
Payer: MEDICARE

## 2025-02-12 VITALS
HEIGHT: 64 IN | BODY MASS INDEX: 23.05 KG/M2 | TEMPERATURE: 97.9 F | RESPIRATION RATE: 20 BRPM | HEART RATE: 81 BPM | DIASTOLIC BLOOD PRESSURE: 69 MMHG | OXYGEN SATURATION: 100 % | WEIGHT: 135 LBS | SYSTOLIC BLOOD PRESSURE: 159 MMHG

## 2025-02-12 DIAGNOSIS — E78.5 DYSLIPIDEMIA: ICD-10-CM

## 2025-02-12 DIAGNOSIS — R10.13 EPIGASTRIC PAIN: ICD-10-CM

## 2025-02-12 DIAGNOSIS — N18.4 CHRONIC KIDNEY DISEASE, STAGE 4 (SEVERE) (MULTI): ICD-10-CM

## 2025-02-12 DIAGNOSIS — M05.9 RHEUMATOID ARTHRITIS WITH POSITIVE RHEUMATOID FACTOR, INVOLVING UNSPECIFIED SITE (MULTI): ICD-10-CM

## 2025-02-12 DIAGNOSIS — R07.9 CHEST PAIN, UNSPECIFIED TYPE: Primary | ICD-10-CM

## 2025-02-12 LAB
BASOPHILS # BLD AUTO: 0.03 X10*3/UL (ref 0–0.1)
BASOPHILS NFR BLD AUTO: 0.4 %
EOSINOPHIL # BLD AUTO: 0.02 X10*3/UL (ref 0–0.4)
EOSINOPHIL NFR BLD AUTO: 0.2 %
ERYTHROCYTE [DISTWIDTH] IN BLOOD BY AUTOMATED COUNT: 13.4 % (ref 11.5–14.5)
HCT VFR BLD AUTO: 29.6 % (ref 36–46)
HGB BLD-MCNC: 10.2 G/DL (ref 12–16)
IMM GRANULOCYTES # BLD AUTO: 0.01 X10*3/UL (ref 0–0.5)
IMM GRANULOCYTES NFR BLD AUTO: 0.1 % (ref 0–0.9)
LYMPHOCYTES # BLD AUTO: 2.95 X10*3/UL (ref 0.8–3)
LYMPHOCYTES NFR BLD AUTO: 35.6 %
MCH RBC QN AUTO: 29.4 PG (ref 26–34)
MCHC RBC AUTO-ENTMCNC: 34.5 G/DL (ref 32–36)
MCV RBC AUTO: 85 FL (ref 80–100)
MONOCYTES # BLD AUTO: 0.96 X10*3/UL (ref 0.05–0.8)
MONOCYTES NFR BLD AUTO: 11.6 %
NEUTROPHILS # BLD AUTO: 4.32 X10*3/UL (ref 1.6–5.5)
NEUTROPHILS NFR BLD AUTO: 52.1 %
NRBC BLD-RTO: 0 /100 WBCS (ref 0–0)
PLATELET # BLD AUTO: 281 X10*3/UL (ref 150–450)
RBC # BLD AUTO: 3.47 X10*6/UL (ref 4–5.2)
WBC # BLD AUTO: 8.3 X10*3/UL (ref 4.4–11.3)

## 2025-02-12 PROCEDURE — 99285 EMERGENCY DEPT VISIT HI MDM: CPT | Mod: 25 | Performed by: EMERGENCY MEDICINE

## 2025-02-12 PROCEDURE — 96374 THER/PROPH/DIAG INJ IV PUSH: CPT

## 2025-02-12 PROCEDURE — 36415 COLL VENOUS BLD VENIPUNCTURE: CPT | Performed by: EMERGENCY MEDICINE

## 2025-02-12 PROCEDURE — 71045 X-RAY EXAM CHEST 1 VIEW: CPT | Mod: FOREIGN READ | Performed by: RADIOLOGY

## 2025-02-12 PROCEDURE — 84484 ASSAY OF TROPONIN QUANT: CPT | Performed by: EMERGENCY MEDICINE

## 2025-02-12 PROCEDURE — 2500000002 HC RX 250 W HCPCS SELF ADMINISTERED DRUGS (ALT 637 FOR MEDICARE OP, ALT 636 FOR OP/ED): Performed by: EMERGENCY MEDICINE

## 2025-02-12 PROCEDURE — 80053 COMPREHEN METABOLIC PANEL: CPT | Performed by: EMERGENCY MEDICINE

## 2025-02-12 PROCEDURE — 71045 X-RAY EXAM CHEST 1 VIEW: CPT

## 2025-02-12 PROCEDURE — 83690 ASSAY OF LIPASE: CPT | Performed by: EMERGENCY MEDICINE

## 2025-02-12 PROCEDURE — 2500000004 HC RX 250 GENERAL PHARMACY W/ HCPCS (ALT 636 FOR OP/ED): Performed by: EMERGENCY MEDICINE

## 2025-02-12 PROCEDURE — 93005 ELECTROCARDIOGRAM TRACING: CPT

## 2025-02-12 PROCEDURE — 85025 COMPLETE CBC W/AUTO DIFF WBC: CPT | Performed by: EMERGENCY MEDICINE

## 2025-02-12 PROCEDURE — 83735 ASSAY OF MAGNESIUM: CPT | Performed by: EMERGENCY MEDICINE

## 2025-02-12 RX ORDER — ATORVASTATIN CALCIUM 20 MG/1
20 TABLET, FILM COATED ORAL DAILY
Qty: 90 TABLET | Refills: 2 | Status: SHIPPED | OUTPATIENT
Start: 2025-02-12

## 2025-02-12 RX ORDER — HYDROXYCHLOROQUINE SULFATE 200 MG/1
TABLET, FILM COATED ORAL DAILY
Qty: 90 TABLET | Refills: 1 | Status: SHIPPED | OUTPATIENT
Start: 2025-02-12

## 2025-02-12 RX ORDER — SUCRALFATE 1 G/10ML
1 SUSPENSION ORAL ONCE
Status: COMPLETED | OUTPATIENT
Start: 2025-02-12 | End: 2025-02-12

## 2025-02-12 RX ORDER — SODIUM BICARBONATE 650 MG/1
650 TABLET ORAL 2 TIMES DAILY
Qty: 180 TABLET | Refills: 2 | Status: SHIPPED | OUTPATIENT
Start: 2025-02-12

## 2025-02-12 RX ORDER — FAMOTIDINE 10 MG/ML
40 INJECTION, SOLUTION INTRAVENOUS ONCE
Status: COMPLETED | OUTPATIENT
Start: 2025-02-12 | End: 2025-02-12

## 2025-02-12 RX ADMIN — FAMOTIDINE 40 MG: 10 INJECTION, SOLUTION INTRAVENOUS at 23:44

## 2025-02-12 RX ADMIN — SUCRALFATE ORAL SUSPENSION 1 G: 1 SUSPENSION ORAL at 23:44

## 2025-02-12 ASSESSMENT — PAIN DESCRIPTION - PROGRESSION: CLINICAL_PROGRESSION: NOT CHANGED

## 2025-02-12 ASSESSMENT — PAIN DESCRIPTION - DESCRIPTORS
DESCRIPTORS: PRESSURE
DESCRIPTORS: PRESSURE

## 2025-02-12 ASSESSMENT — PAIN SCALES - GENERAL
PAINLEVEL_OUTOF10: 6
PAINLEVEL_OUTOF10: 6

## 2025-02-12 ASSESSMENT — LIFESTYLE VARIABLES
HAVE PEOPLE ANNOYED YOU BY CRITICIZING YOUR DRINKING: NO
EVER FELT BAD OR GUILTY ABOUT YOUR DRINKING: NO
EVER HAD A DRINK FIRST THING IN THE MORNING TO STEADY YOUR NERVES TO GET RID OF A HANGOVER: NO
HAVE YOU EVER FELT YOU SHOULD CUT DOWN ON YOUR DRINKING: NO
TOTAL SCORE: 0

## 2025-02-12 ASSESSMENT — COLUMBIA-SUICIDE SEVERITY RATING SCALE - C-SSRS
1. IN THE PAST MONTH, HAVE YOU WISHED YOU WERE DEAD OR WISHED YOU COULD GO TO SLEEP AND NOT WAKE UP?: NO
6. HAVE YOU EVER DONE ANYTHING, STARTED TO DO ANYTHING, OR PREPARED TO DO ANYTHING TO END YOUR LIFE?: NO
2. HAVE YOU ACTUALLY HAD ANY THOUGHTS OF KILLING YOURSELF?: NO

## 2025-02-12 ASSESSMENT — PAIN DESCRIPTION - PAIN TYPE: TYPE: ACUTE PAIN

## 2025-02-12 ASSESSMENT — PAIN DESCRIPTION - LOCATION: LOCATION: CHEST

## 2025-02-12 ASSESSMENT — PAIN - FUNCTIONAL ASSESSMENT: PAIN_FUNCTIONAL_ASSESSMENT: 0-10

## 2025-02-12 ASSESSMENT — PAIN DESCRIPTION - ORIENTATION: ORIENTATION: MID

## 2025-02-13 LAB
ALBUMIN SERPL BCP-MCNC: 4.1 G/DL (ref 3.4–5)
ALP SERPL-CCNC: 60 U/L (ref 33–136)
ALT SERPL W P-5'-P-CCNC: 11 U/L (ref 7–45)
ANION GAP SERPL CALCULATED.3IONS-SCNC: 14 MMOL/L (ref 10–20)
AST SERPL W P-5'-P-CCNC: 16 U/L (ref 9–39)
BILIRUB SERPL-MCNC: 0.4 MG/DL (ref 0–1.2)
BUN SERPL-MCNC: 48 MG/DL (ref 6–23)
CALCIUM SERPL-MCNC: 8.9 MG/DL (ref 8.6–10.3)
CARDIAC TROPONIN I PNL SERPL HS: 16 NG/L (ref 0–13)
CHLORIDE SERPL-SCNC: 105 MMOL/L (ref 98–107)
CO2 SERPL-SCNC: 23 MMOL/L (ref 21–32)
CREAT SERPL-MCNC: 2.04 MG/DL (ref 0.5–1.05)
EGFRCR SERPLBLD CKD-EPI 2021: 24 ML/MIN/1.73M*2
GLUCOSE SERPL-MCNC: 121 MG/DL (ref 74–99)
LIPASE SERPL-CCNC: 96 U/L (ref 9–82)
MAGNESIUM SERPL-MCNC: 2.27 MG/DL (ref 1.6–2.4)
POTASSIUM SERPL-SCNC: 4.7 MMOL/L (ref 3.5–5.3)
PROT SERPL-MCNC: 6.6 G/DL (ref 6.4–8.2)
SODIUM SERPL-SCNC: 137 MMOL/L (ref 136–145)

## 2025-02-13 RX ORDER — SUCRALFATE 1 G/1
1 TABLET ORAL
Qty: 20 TABLET | Refills: 0 | Status: SHIPPED | OUTPATIENT
Start: 2025-02-13 | End: 2025-02-18

## 2025-02-13 RX ORDER — FAMOTIDINE 20 MG/1
20 TABLET, FILM COATED ORAL 2 TIMES DAILY
Qty: 30 TABLET | Refills: 0 | Status: SHIPPED | OUTPATIENT
Start: 2025-02-13 | End: 2025-02-28

## 2025-02-13 NOTE — DISCHARGE INSTRUCTIONS
Thank you for choosing [facility] Emergency Department and allowing me to take care of you today.     If your symptoms are not improving, begin to worsen, new symptoms develop/additional concerns arise, please return to the Emergency Department at any time for further evaluation and  treatment.     Dr. Vicente De La Cruz Jr., D.O.     Follow-up with your primary care doctor or any emergency department in the next 2-3 days for re-evaluation and further treatment as deemed necessary    If symptoms/discomfort are not improving in the next 12-24 hours, feeling worse, developed high fever, or persistent vomiting, or new symptoms develop should be seen sooner by PCP or in any emergency department as more dangerous etiologies of symptoms may be early in the course of development upon initial presentation cannot be completely ruled out

## 2025-02-13 NOTE — ED PROVIDER NOTES
EMERGENCY DEPARTMENT ENCOUNTER      Pt Name: Kena Bolaños  MRN: [id number]  Birthdate 1940  Date of evaluation: 2/12/2025  ED Provider: Vicente De La Cruz DO     CHIEF COMPLAINT       Chief Complaint   Patient presents with    Chest Pain         HISTORY OF PRESENT ILLNESS   (Location/Symptom, Timing/Onset, Context/Setting, Quality, Duration, Modifying Factors, Severity) Note limiting factors.       HPI    Kena Bolaños is a 84 y.o. who presents to the emergency department patient presents by EMS transport with epigastric pain.  Epigastric area pain rating superiorly been approximately 9 PM.given asa and sublingual nitroglycerin by ems prior to arrival with some improvement. No vomiting. Pain currently 4/10    Nursing Notes were reviewed.    History obtained from :  patient    Outside records reviewed: N/A      History/Collateral information obtained from: N/A        Patient's PDMP reviewed personally    ED Course as of 02/14/25 0642   Wed Feb 12, 2025   2332 EKG interpretation  Obtained 1129 reviewed 1132  Normal sinus rhythm normal EKG no acute ST elevation depression or signs of acute ischemia rate 77  QRS 86 QTc 49 no bundle branch block normal axis nonspecific ST changes  Per my depend interpretation    Patient EKG not acutely ischemic, vital signs reassuring was eating hot dog and part of doughnut like pastry prior to onset, does have history of GERD will time medication reevaluate felt some improvement nitro however that could have due to the effect on the lower esophageal sphincter although ACS not ruled out pending troponin results [SL]   u Feb 13, 2025   0034 Patient sleeping comfortably in room, felt marked improvement with medications given vital signs remained stable will continue to monitor for final disposition [SL]   0036 Patient renal function trended and stable, slight increase of lipase but no significant I will concern for pancreatitis cholecystic disease at this time [SL]   0059  Symptoms remain resolved, no significant elevation of troponin, will discharge the follows outpatient [SL]      ED Course User Index  [SL] Vicente De La Cruz DO         Diagnoses as of 02/14/25 0642   Chest pain, unspecified type   Epigastric pain        Discussion/ MDM:         Differential diagnosis includes but not limited to: acs, gastritis, pancreatitis    All results/imaging if obtained reviewed and interpreted, results trended/compared with previous levels if available , troponin withn normal , considered obtaining CT chest abdomen pelvis however patient afebrile vital signs within baseline normal limits, symptomatically improving, unlikely to contribute to plan for care currently and do not feel necessary at this time, may require in future if symptoms persist or new symptoms develop for further evaluation  Discussed all results obtained from imaging or labs with patient and those present with patient´s permission, provided opportunity to ask any further questions, all questions answered to the best of my ability, feels comfortable with discharge and plan to follow up with PCP and cardiology as outpatient. Will return at any time if symptoms worsen, new symptoms develop, or any new concerns arise. Chart created with voice recognition software, errors may be present due to software´s interpretation          REVIEW OF SYSTEMS     Review of Systems  Pertinent positives and negatives as per HPI    PAST MEDICAL HISTORY     Past Medical History:   Diagnosis Date    Acute poliomyelitis, unspecified 03/30/2015    Polio    Atherosclerotic heart disease of native coronary artery without angina pectoris 09/08/2023    Echo EF 50-55 Apical hypokinesis  12/23./    Raygoza's esophagus without dysplasia 03/30/2015    Raygoza's esophagus    Chronic renal disease, stage IV (Multi) 09/08/2023        Dry mouth     Post-polio syndrome (CMS-[facility]) 01/29/2024    Right foot    Rheumatoid arthritis with rheumatoid factor  2023    Dr. Dena Soto    Temporomandibular joint syndrome 2023       SURGICAL HISTORY       Past Surgical History:   Procedure Laterality Date    APPENDECTOMY      BREAST RECONSTRUCTION Bilateral          SECTION, CLASSIC  2015     Section    CHOLECYSTECTOMY  2015    Cholecystectomy    CT GUIDED IMAGING FOR NEEDLE PLACEMENT  2018    CT GUIDED IMAGING FOR NEEDLE PLACEMENT [facility]IENT LEGACY    FOOT SURGERY  2015    Foot Surgery right '16 screw Dr.Dillar Rosado       CURRENT MEDICATIONS       Discharge Medication List as of 2025  1:04 AM        CONTINUE these medications which have NOT CHANGED    Details   acetaminophen (TylenoL) 325 mg tablet Take 2 tablets (650 mg) by mouth. 2-3 times a day, Historical Med      amLODIPine (Norvasc) 5 mg tablet Take 1 tablet (5 mg) by mouth once daily., Starting 2024, Until 2025, Normal      atorvastatin (Lipitor) 20 mg tablet TAKE 1 TABLET BY MOUTH EVERY DAY, Starting 2025, Normal      cyanocobalamin (Vitamin B-12) 1,000 mcg tablet Take 1 tablet (1,000 mcg) by mouth once daily. As directed, Starting Mon 10/29/2018, Historical Med      denosumab (Prolia) 60 mg/mL syringe Inject 1 mL (60 mg total) under the skin every 6 months., Starting 2024, Until 2025, Normal      diclofenac epolamine 1.3 % patch Place on the skin twice a day. Affected area, Starting 2020, Historical Med      docusate sodium (Colace) 100 mg capsule Take 1 capsule (100 mg) by mouth 2 times a day as needed., Starting 2016, Historical Med      fluticasone (Flonase) 50 mcg/actuation nasal spray Administer 1 spray into each nostril once daily. Shake gently. Before first use, prime pump. After use, clean tip and replace cap., Starting Thu 10/17/2024, Until Fri 10/17/2025, Normal      hydroxychloroquine (Plaquenil) 200 mg tablet TAKE 1 TABLET BY MOUTH EVERY DAY, Starting 2025,  Normal      levothyroxine (Synthroid, Levoxyl) 75 mcg tablet TAKE 1 TABLET BY MOUTH IN THE MORNING ON AN EMPTY STOMACH MONDAY-SATURDAY, Normal      methyl salicylate/menth/camph (SALONPAS TOP) Place on the skin 2 times a week., Historical Med      metoprolol tartrate (Lopressor) 50 mg tablet Take 1 tablet by mouth 2 times daily (morning and late afternoon)., Starting Mon 11/18/2024, Until Sun 2/16/2025, Normal      nitroglycerin (Nitrostat) 0.4 mg SL tablet Place under the tongue. PLACE 1 TABLET UNDER THE TONGUE EVERY 5 MINUTES FOR UP TO 3 DOSES AS NEEDED FOR CHEST PAIN.CALL 911 IF PAIN PERSISTS., Starting Wed 6/1/2016, Historical Med      pantoprazole (ProtoNix) 40 mg EC tablet Take 1 tablet (40 mg) by mouth once daily., Starting Tue 8/8/2017, Historical Med      sodium bicarbonate 650 mg tablet TAKE 1 TABLET BY MOUTH TWICE A DAY, Starting Wed 2/12/2025, Normal      tetrahydroz/dext 70/peg 400/pv (EYE DROPS ADVANCED RELIEF OPHT) Eye Drops Relief, Historical Med             ALLERGIES     Penicillins, Sulfa (sulfonamide antibiotics), Losartan potassium, and Bee venom protein (honey bee)    FAMILY HISTORY       Family History   Problem Relation Name Age of Onset    Diabetes Mother      Colon cancer Mother      Diabetes Maternal Grandmother      Diabetes Maternal Grandfather      Pancreatic cancer Sibling          SOCIAL HISTORY       Social History     Socioeconomic History    Marital status:    Tobacco Use    Smoking status: Never     Passive exposure: Never    Smokeless tobacco: Never   Vaping Use    Vaping status: Never Used   Substance and Sexual Activity    Alcohol use: Yes    Drug use: Never    Sexual activity: Defer       PHYSICAL EXAM       ED Triage Vitals [02/12/25 2330]   Temp Heart Rate Respirations BP   -- 81 20 159/69      Pulse Ox Temp src Heart Rate Source Patient Position   100 % -- Monitor --      BP Location FiO2 (%)     -- --         Physical Exam  PHYSICIAL EXAM: Vitals reviewed  GENERAL:  awake and alert, non diaphoretic, non toxic appearing, The patient appears nourished and normally developed.  Nontoxic appearing no acute distress, vital signs as documented.     EYES: Head exam is unremarkable. No scleral icterus     HEENT: Mucous membranes moist. Nares patent without copious rhinorrhea.     LUNGS: Lungs are clear to auscultation, -r/r/w without any respiratory distress.     CARDIAC: Rhythm is regular. No dysrhythmias      ABDOMEN: +mild Tender palpation midepigastric area, otherwise soft, non-tender, non-distended, no rebound/guarding, with no obvious masses, no pulsatile mass     EXTREMITIES: No peripheral edema, with no obvious deformities.     SKIN: Good color, with no significant rashes. No pallor.     NEURO: No obvious neurological deficits, normal sensation and strength bilaterally.      PSYCH: Mood and affect normal. Appropriate for age.    DIAGNOSTIC RESULTS     RADIOLOGY (Per Emergency Physician):   Per my independendant interpretation pre-garcia read   Xr chest no focal infiltrate or otherwise normal  defer to radiologist final report  Interpretation per the Radiologist below, if available at the time of this note:  XR chest 1 view   Final Result   No acute pulmonary pathology.   Signed by Tyrone Chacon MD            LABS:  Labs Reviewed   LIPASE - Abnormal       Result Value    Lipase 96 (*)     Narrative:     Venipuncture immediately after or during the administration of Metamizole may lead to falsely low results. Testing should be performed immediately prior to Metamizole dosing.   COMPREHENSIVE METABOLIC PANEL - Abnormal    Glucose 121 (*)     Sodium 137      Potassium 4.7      Chloride 105      Bicarbonate 23      Anion Gap 14      Urea Nitrogen 48 (*)     Creatinine 2.04 (*)     eGFR 24 (*)     Calcium 8.9      Albumin 4.1      Alkaline Phosphatase 60      Total Protein 6.6      AST 16      Bilirubin, Total 0.4      ALT 11     CBC WITH AUTO DIFFERENTIAL - Abnormal    WBC 8.3       nRBC 0.0      RBC 3.47 (*)     Hemoglobin 10.2 (*)     Hematocrit 29.6 (*)     MCV 85      MCH 29.4      MCHC 34.5      RDW 13.4      Platelets 281      Neutrophils % 52.1      Immature Granulocytes %, Automated 0.1      Lymphocytes % 35.6      Monocytes % 11.6      Eosinophils % 0.2      Basophils % 0.4      Neutrophils Absolute 4.32      Immature Granulocytes Absolute, Automated 0.01      Lymphocytes Absolute 2.95      Monocytes Absolute 0.96 (*)     Eosinophils Absolute 0.02      Basophils Absolute 0.03     TROPONIN I, HIGH SENSITIVITY - Abnormal    Troponin I, High Sensitivity 16 (*)     Narrative:     Less than 99th percentile of normal range cutoff-  Female and children under 18 years old <14 ng/L; Male <21 ng/L: Negative  Repeat testing should be performed if clinically indicated.     Female and children under 18 years old 14-50 ng/L; Male 21-50 ng/L:  Consistent with possible cardiac damage and possible increased clinical   risk. Serial measurements may help to assess extent of myocardial damage.     >50 ng/L: Consistent with cardiac damage, increased clinical risk and  myocardial infarction. Serial measurements may help assess extent of   myocardial damage.      NOTE: Children less than 1 year old may have higher baseline troponin   levels and results should be interpreted in conjunction with the overall   clinical context.     NOTE: Troponin I testing is performed using a different   testing methodology at [facility] than at other   [facility] hospitals. Direct result comparisons should only   be made within the same method.   MAGNESIUM - Normal    Magnesium 2.27         All other labs were within normal range or not returned as of this dictation.    EMERGENCY DEPARTMENT COURSE :   Vitals:    Vitals:    02/12/25 2330 02/12/25 2331 02/12/25 2334   BP: 159/69     Pulse: 81     Resp: 20     Temp:   36.6 °C (97.9 °F)   TempSrc:   Oral   SpO2: 100% 100%    Weight: 61.2 kg (135 lb)     Height: 1.626 m  "(5' 4\")         Medications   famotidine PF (Pepcid) injection 40 mg (40 mg intravenous Given 2/12/25 2344)   sucralfate (Carafate) suspension 1 g (1 g oral Given 2/12/25 2344)             PROCEDURES:  Unless otherwise noted below, none     Procedures    CRITICAL CARE TIME       FINAL IMPRESSION      1. Chest pain, unspecified type    2. Epigastric pain          DISPOSITION    Discharge 02/13/2025 01:00:02 AM    PATIENT REFERRED TO:  Martha Grace MD  [id number]  [facility]  [facility] [address]  [phone]            DISCHARGE MEDICATIONS:  Discharge Medication List as of 2/13/2025  1:04 AM        START taking these medications    Details   famotidine (Pepcid) 20 mg tablet Take 1 tablet (20 mg) by mouth 2 times a day for 15 days., Starting Thu 2/13/2025, Until Fri 2/28/2025, Normal      sucralfate (Carafate) 1 gram tablet Take 1 tablet (1 g) by mouth 4 times a day before meals for 5 days., Starting Thu 2/13/2025, Until Tue 2/18/2025, Normal                (Comment: Please note this report has been produced using speech recognition software and may contain errors related to that system including errors in grammar, punctuation, and spelling, as well as words and phrases that may be inappropriate.  If there are any questions or concerns please feel free to contact the dictating provider for clarification.)    Vicente De La Cruz DO (electronically signed)  Emergency Medicine Provider     Vicente De La Cruz DO  02/14/25 0642    "

## 2025-02-13 NOTE — ED TRIAGE NOTES
Pt presents via EMS with  c/o CP that started after eating. Pt states the pain feels like a pressure. Pt states she also has pain in her jaw and left arm. No c/o SOB. Pt was given 324 of asa and 1 nitroglycerin in the squad

## 2025-02-14 LAB
ATRIAL RATE: 77 BPM
P AXIS: 28 DEGREES
P OFFSET: 208 MS
P ONSET: 148 MS
PR INTERVAL: 150 MS
Q ONSET: 223 MS
QRS COUNT: 13 BEATS
QRS DURATION: 86 MS
QT INTERVAL: 406 MS
QTC CALCULATION(BAZETT): 459 MS
QTC FREDERICIA: 441 MS
R AXIS: 43 DEGREES
T AXIS: 13 DEGREES
T OFFSET: 426 MS
VENTRICULAR RATE: 77 BPM

## 2025-03-14 LAB
ALBUMIN SERPL-MCNC: 4.4 G/DL (ref 3.6–5.1)
ALP SERPL-CCNC: 45 U/L (ref 37–153)
ALT SERPL-CCNC: 9 U/L (ref 6–29)
ANION GAP SERPL CALCULATED.4IONS-SCNC: 11 MMOL/L (CALC) (ref 7–17)
AST SERPL-CCNC: 16 U/L (ref 10–35)
BILIRUB SERPL-MCNC: 0.6 MG/DL (ref 0.2–1.2)
BUN SERPL-MCNC: 29 MG/DL (ref 7–25)
CALCIUM SERPL-MCNC: 9.4 MG/DL (ref 8.6–10.4)
CHLORIDE SERPL-SCNC: 104 MMOL/L (ref 98–110)
CO2 SERPL-SCNC: 25 MMOL/L (ref 20–32)
CREAT SERPL-MCNC: 2.08 MG/DL (ref 0.6–0.95)
EGFRCR SERPLBLD CKD-EPI 2021: 23 ML/MIN/1.73M2
GLUCOSE SERPL-MCNC: 89 MG/DL (ref 65–99)
POTASSIUM SERPL-SCNC: 4.5 MMOL/L (ref 3.5–5.3)
PROT SERPL-MCNC: 6.9 G/DL (ref 6.1–8.1)
SODIUM SERPL-SCNC: 140 MMOL/L (ref 135–146)

## 2025-03-17 DIAGNOSIS — M81.0 OSTEOPOROSIS, SENILE: ICD-10-CM

## 2025-03-26 ENCOUNTER — APPOINTMENT (OUTPATIENT)
Dept: INFUSION THERAPY | Facility: CLINIC | Age: 85
End: 2025-03-26
Payer: MEDICARE

## 2025-03-26 VITALS
HEART RATE: 66 BPM | TEMPERATURE: 97.7 F | DIASTOLIC BLOOD PRESSURE: 71 MMHG | SYSTOLIC BLOOD PRESSURE: 125 MMHG | BODY MASS INDEX: 23.21 KG/M2 | OXYGEN SATURATION: 100 % | RESPIRATION RATE: 16 BRPM | WEIGHT: 135.2 LBS

## 2025-03-26 DIAGNOSIS — I10 ESSENTIAL HYPERTENSION: ICD-10-CM

## 2025-03-26 DIAGNOSIS — M81.0 OSTEOPOROSIS, SENILE: ICD-10-CM

## 2025-03-26 PROCEDURE — 96372 THER/PROPH/DIAG INJ SC/IM: CPT | Performed by: NURSE PRACTITIONER

## 2025-03-26 RX ORDER — EPINEPHRINE 0.3 MG/.3ML
0.3 INJECTION SUBCUTANEOUS EVERY 5 MIN PRN
OUTPATIENT
Start: 2025-09-19

## 2025-03-26 RX ORDER — DIPHENHYDRAMINE HYDROCHLORIDE 50 MG/ML
50 INJECTION, SOLUTION INTRAMUSCULAR; INTRAVENOUS AS NEEDED
OUTPATIENT
Start: 2025-09-19

## 2025-03-26 RX ORDER — ALBUTEROL SULFATE 0.83 MG/ML
3 SOLUTION RESPIRATORY (INHALATION) AS NEEDED
OUTPATIENT
Start: 2025-09-19

## 2025-03-26 RX ORDER — FAMOTIDINE 10 MG/ML
20 INJECTION, SOLUTION INTRAVENOUS ONCE AS NEEDED
OUTPATIENT
Start: 2025-09-19

## 2025-03-26 RX ORDER — METOPROLOL TARTRATE 50 MG/1
50 TABLET ORAL
Qty: 180 TABLET | Refills: 1 | Status: SHIPPED | OUTPATIENT
Start: 2025-03-26 | End: 2025-09-22

## 2025-03-26 ASSESSMENT — ENCOUNTER SYMPTOMS
LIGHT-HEADEDNESS: 0
PALPITATIONS: 0
SHORTNESS OF BREATH: 0
DIZZINESS: 0
WOUND: 0
LEG SWELLING: 0
EXTREMITY WEAKNESS: 1
COUGH: 0
NUMBNESS: 1
WHEEZING: 0

## 2025-03-26 NOTE — PROGRESS NOTES
[facility]   Infusion Clinic Note   Date: 2025   Name: Kena Bolaños  : 1940   MRN: [id number]         Reason for Visit: Follow-up and Injections (6 month Prolia 60mg subcutaneous/injection)         Today: We administered denosumab.       Ordered By: Gia Fernandes MD       For a Diagnosis of: Osteoporosis, senile       At today's visit patient accompanied by: Self      Today's Vitals:   Vitals:    25 0956   BP: 125/71   Pulse: 66   Resp: 16   Temp: 36.5 °C (97.7 °F)   TempSrc: Temporal   SpO2: 100%   Weight: 61.3 kg (135 lb 3.2 oz)             Pre - Treatment Checklist:      - Previous reaction to current treatment: no      (Assess patient for the concerns below. Document provider notification as appropriate).  - Active or recent infection with/without current antibiotic use: no  - Recent or planned invasive dental work: no  - Recent or planned surgeries: yes  - Recently received or plans to receive vaccinations: no  - Has treatment related toxicities: no  - Any chance may be pregnant:  no      Pain: 0   - Is the pain different from normal: no   - Is prescribing Doctor aware:  n/a      Labs: Reviewed       Fall Risk Screening: Lopez Fall Risk  History of Falling, Immediate or Within 3 Months: No  Secondary Diagnosis: No  Ambulatory Aid: Walks without aid/bedrest/nurse assist  Intravenous Therapy/Heparin Lock: No  Gait/Transferring: Normal/bedrest/immobile  Mental Status: Oriented to own ability  Lopez Fall Risk Score: 0       Review Of Systems:  Review of Systems   Respiratory:  Negative for cough, shortness of breath and wheezing.    Cardiovascular:  Negative for chest pain, leg swelling and palpitations.   Skin:  Negative for itching, rash and wound.   Neurological:  Positive for extremity weakness and numbness. Negative for dizziness and light-headedness.        Patient has numbness and extremity weakness in her right leg due to having Polio years ago.     Patient has had several surgeries         Infusion Readiness:  - Assessment Concerns Related to Infusion: No  - Provider notified: n/a      New Patient Education:    N/A (returning patient for continuation of therapy. Ongoing education provided as needed.)        Treatment Conditions & Drug Specific Questions:    Denosumab  (PROLIA. XGEVA)    (Unless otherwise specified on patient specific therapy plan):     TREATMENT CONDITIONS:  Unless otherwise specified on patient specific therapy plan HOLD and notify provider prior to proceeding with today's injection if patients:  O Corrected or Serum Calcium LESS THAN 8.6 mg/dL  OR Ionized calcium less than 1.1 mmol/L or  less than 4.7 mg/dL (depending on resulting agency)  o Recent or planned invasive dental procedure (within 4 weeks)    Lab Results   Component Value Date    CALCIUM 9.4 03/13/2025    PHOS 5.2 (H) 11/18/2024      Lab Results   Component Value Date    CAION 1.14 02/21/2024       Patient meets treatment conditions? Yes    DRUG SPECIFIC QUESTIONS:  Is the patient taking calcium and vitamin D? Yes - Vitamin D    No - to calcium.   took patient off of calcium  (Recommended)    Pt Instructed on following risks: (1) hypocalcemia, (2) osteonecrosis of the jaw, (3) atypical femoral fractures, (4) serious infections, and (5) dermatologic reactions?  Yes      REMINDER:  PREGNANCY CATEGORY X DRUG. OBTAIN NEGTATIVE PREGNANCY TEST PRIOR TO FIRST INFUSION FOR WOMEN OF CHILDBEARING ABILITY   REMS DRUG    Recommended Vitals/Observation:  Vitals: Obtain vitals prior to injection.  Observation: Patient may leave immediately following injection.        Weight Based Drug Calculations:    WEIGHT BASED DRUGS: NOT APPLICABLE / FLAT DOSE       Post Treatment: Patient tolerated treatment without issue and was discharged in no apparent distress.      Note Authored / Patient Cared for By: Ariela Arzate LPN    _________________________________________________________________________    Note authored and patient cared for by: Ariela Arzate LPN  Note/Encounter reviewed by: Kristina HUI NP. This provider on site at time of patient injection. Staff to notify this provider of any questions, concerns, abnormals or issues during encounter. No issues reported. Pt. tolerated without difficulty. Pt. not independently evaluated by this provider during today's encounter

## 2025-03-26 NOTE — PATIENT INSTRUCTIONS
Today :We administered denosumab. 6 month Prolia 60mg subcutaneous injection in the right upper arm.  Blood calcium level to be drawn within 28 days of next Prolia appointment.      For:   1. Osteoporosis, senile       Your next appointment is due in:  6 months        Please read the  Medication Guide that was given to you and reviewed during todays visit.     (Tell all doctors including dentists that you are taking this medication)     Go to the emergency room or call 911 if:  -You have signs of allergic reaction:   -Rash, hives, itching.   -Swollen, blistered, peeling skin.   -Swelling of face, lips, mouth, tongue or throat.   -Tightness of chest, trouble breathing, swallowing or talking     Call your doctor:  - If injection site gets red, warm, swollen, itchy or leaks fluid or pus.     (Leave band aid on your injection site for at least 2 hours and keep area clean and dry  - If you get sick or have symptoms of infection or are not feeling well for any reason.    (Wash your hands often, stay away from people who are sick)  - If you have side effects from your medication that do not go away or are bothersome.     (Refer to the teaching your nurse gave you for side effects to call your doctor about)    - Common side effects may include:  stuffy nose, headache, feeling tired, muscle aches, upset stomach  - Before receiving any vaccines     - Call the [facility] at   If:  - You get sick, are on antibiotics, have had a recent vaccine, have surgery or dental work and your doctor wants your visit rescheduled.  - You need to cancel and reschedule your visit for any reason. Call at least 2 days before your visit if you need to cancel.   - Your insurance changes before your next visit.    (We will need to get approval from your new insurance. This can take up to two weeks.)     The [facility] is opened Monday thru Friday. We are closed on weekends and holidays.   Voice mail will take your  call if the center is closed. If you leave a message please allow 24 hours for a call back during weekdays. If you leave a message on a weekend/holiday, we will call you back the next business day.    A pharmacist is available Monday - Friday from 8:30AM to 3:30PM to help answer any questions you may have about your prescriptions(s). Please call pharmacy at:    [facility]: [phone]  [facility]: [phone]  [facility]: [phone]

## 2025-03-26 NOTE — PROGRESS NOTES
[facility]   Infusion Clinic Note   Date: 2025   Name: Kena Bolaños  : 1940   MRN: [id number]         Reason for Visit: Follow-up and Injections (6 month Prolia 60mg subcutaneous/injection)         Today: We administered denosumab.       Ordered By: Gia Fernandse MD       For a Diagnosis of: Osteoporosis, senile       At today's visit patient accompanied by: Self      Today's Vitals:   Vitals:    25 0956   BP: 125/71   Pulse: 66   Resp: 16   Temp: 36.5 °C (97.7 °F)   TempSrc: Temporal   SpO2: 100%   Weight: 61.3 kg (135 lb 3.2 oz)             Pre - Treatment Checklist:      - Previous reaction to current treatment: no      (Assess patient for the concerns below. Document provider notification as appropriate).  - Active or recent infection with/without current antibiotic use: no  - Recent or planned invasive dental work: no  - Recent or planned surgeries: no  - Recently received or plans to receive vaccinations: no  - Has treatment related toxicities: no  - Any chance may be pregnant:  no      Pain: 0   - Is the pain different from normal: no   - Is prescribing Doctor aware:  no      Labs: Labs drawn and sent per order      Fall Risk Screening: Lopez Fall Risk  History of Falling, Immediate or Within 3 Months: No  Secondary Diagnosis: No  Ambulatory Aid: Walks without aid/bedrest/nurse assist  Intravenous Therapy/Heparin Lock: No  Gait/Transferring: Normal/bedrest/immobile  Mental Status: Oriented to own ability  Lopez Fall Risk Score: 0       Review Of Systems:  Review of Systems   Respiratory:  Negative for cough, shortness of breath and wheezing.    Cardiovascular:  Negative for chest pain, leg swelling and palpitations.   Skin:  Negative for itching, rash and wound.   Neurological:  Positive for extremity weakness and numbness. Negative for dizziness and light-headedness.        Numbness in the right due to Polio.   Several surgeries.  Extremity weakness.    "Patient states her gait is unstable.   Unbalanced.  She has an appointment with  to discuss this             Infusion Readiness:  - Assessment Concerns Related to Infusion: No  - Provider notified: n/a      New Patient Education:    N/A (returning patient for continuation of therapy. Ongoing education provided as needed.)        Treatment Conditions & Drug Specific Questions:    DAPTOmycin (CUBICIN)  Monitoring Parameters:  Vitals: Start of infusion, end of infusion, end of observation period and as needed.  Observation: Observe for 30 minutes after FIRST infusion.     Nursing Physical Assessment/Monitoring:    Any symptoms of C-Diff which may include: bloody diarrhea, nausea, vomiting, abdominal pain? {YES wildcard/NO:60}    Any signs of Augustine-Rey syndrome which may include red, swollen, blistered, or peeling skin; red or irritated eyes; or sores in mouth, throat, nose, or eyes? {YES wildcard/NO:60}    Any signs/symptoms of muscle pain or weakness, especially if noted in distal extremities? {YES wildcard/NO:60}     Any new or worsening signs/symptoms of peripheral neuropathy such as: weakness, burning, numbness, tingling? {YES wildcard/NO:60}      Any signs/symptoms of eosinophilic pneumonia which may include: new-onset or worsening fever, dyspnea, difficulty breathing, throat pain, cough? {YES wildcard/NO:60}    Any signs/symptoms of myopathy which may include: muscle weakness, soreness, cramps, stiffness? {YES wildcard/NO:60}    Any signs of urinary tract infection such as fever, burning / pain when passing urine, increased urinary frequency / urgency, lower stomach or pelvic pain? {YES wildcard/NO:60}     (If YES to the above notify prescribing provider prior to proceeding with infusion)    Check ordered labs and report abnormalities as appropriate:    No results found for: \"CMPLAS\", \"CMPLASABS\"   Lab Results   Component Value Date    GLUCOSE 89 03/13/2025    CALCIUM 9.4 03/13/2025     03/13/2025 "    K 4.5 03/13/2025    CO2 25 03/13/2025     03/13/2025    BUN 29 (H) 03/13/2025    CREATININE 2.08 (H) 03/13/2025      Lab Results   Component Value Date    CREATININE 2.08 (H) 03/13/2025        Lab Results   Component Value Date    INR 1.1 10/13/2018    PROTIME 12.3 10/13/2018        Lab Results   Component Value Date    CKTOTAL 80 10/24/2022      and Denosumab  (PROLIA. XGEVA)    (Unless otherwise specified on patient specific therapy plan):     TREATMENT CONDITIONS:  Unless otherwise specified on patient specific therapy plan HOLD and notify provider prior to proceeding with today's injection if patients:  O Corrected or Serum Calcium LESS THAN 8.6 mg/dL  OR Ionized calcium less than 1.1 mmol/L or  less than 4.7 mg/dL (depending on resulting agency)  o Recent or planned invasive dental procedure (within 4 weeks)    Lab Results   Component Value Date    CALCIUM 9.4 03/13/2025    PHOS 5.2 (H) 11/18/2024      Lab Results   Component Value Date    CAION 1.14 02/21/2024       Patient meets treatment conditions? {YES/NO/NA:95291}    DRUG SPECIFIC QUESTIONS:  Is the patient taking calcium and vitamin D? {YES/NO/NA:24310}  (Recommended)    Pt Instructed on following risks: (1) hypocalcemia, (2) osteonecrosis of the jaw, (3) atypical femoral fractures, (4) serious infections, and (5) dermatologic reactions?  {YES/NO/NA:66785}      REMINDER:  PREGNANCY CATEGORY X DRUG. OBTAIN NEGTATIVE PREGNANCY TEST PRIOR TO FIRST INFUSION FOR WOMEN OF CHILDBEARING ABILITY   REMS DRUG    Recommended Vitals/Observation:  Vitals: Obtain vitals prior to injection.  Observation: Patient may leave immediately following injection.        Weight Based Drug Calculations:    {WEIGHT BASED DRUGS:04214}      [facility]}      Note Authored / Patient Cared for By: Ariela Arzate LPN

## 2025-04-19 DIAGNOSIS — E03.9 HYPOTHYROIDISM, UNSPECIFIED: ICD-10-CM

## 2025-04-21 RX ORDER — LEVOTHYROXINE SODIUM 75 UG/1
TABLET ORAL
Qty: 78 TABLET | Refills: 1 | OUTPATIENT
Start: 2025-04-21

## 2025-04-23 DIAGNOSIS — E03.9 HYPOTHYROIDISM, UNSPECIFIED: ICD-10-CM

## 2025-04-23 RX ORDER — LEVOTHYROXINE SODIUM 75 UG/1
75 TABLET ORAL DAILY
Qty: 78 TABLET | Refills: 1 | Status: SHIPPED | OUTPATIENT
Start: 2025-04-23

## 2025-05-03 DIAGNOSIS — R73.9 HYPERGLYCEMIA: ICD-10-CM

## 2025-05-03 DIAGNOSIS — E55.9 VITAMIN D DEFICIENCY: ICD-10-CM

## 2025-05-03 DIAGNOSIS — E03.9 ACQUIRED HYPOTHYROIDISM: ICD-10-CM

## 2025-05-07 LAB
25(OH)D3+25(OH)D2 SERPL-MCNC: 40 NG/ML (ref 30–100)
EST. AVERAGE GLUCOSE BLD GHB EST-MCNC: 105 MG/DL
EST. AVERAGE GLUCOSE BLD GHB EST-SCNC: 5.8 MMOL/L
HBA1C MFR BLD: 5.3 %
TSH SERPL-ACNC: 2.66 MIU/L (ref 0.4–4.5)

## 2025-06-03 ENCOUNTER — EVALUATION (OUTPATIENT)
Dept: PHYSICAL THERAPY | Facility: CLINIC | Age: 85
End: 2025-06-03
Payer: MEDICARE

## 2025-06-03 DIAGNOSIS — R53.1 WEAKNESS: ICD-10-CM

## 2025-06-03 PROCEDURE — 97161 PT EVAL LOW COMPLEX 20 MIN: CPT | Mod: GP | Performed by: PHYSICAL THERAPIST

## 2025-06-03 ASSESSMENT — ENCOUNTER SYMPTOMS
OCCASIONAL FEELINGS OF UNSTEADINESS: 1
LOSS OF SENSATION IN FEET: 0
DEPRESSION: 0

## 2025-06-03 NOTE — PROGRESS NOTES
"Physical Therapy Evaluation and Treatment      Patient Name: Kena Bolaños  MRN: [id number]  Today's Date: 6/3/2025  Time Calculation  Start Time: 1310  Stop Time: 1344  Time Calculation (min): 34 min      Insurance:  Visit number: 1 of 6  Authorization info: 2025: EVAL ONLY - [facility] - AUTH REQ / $30 COPAY / $4150 OOP not met / [place] VISITS / AVAILITY [id number] / ds 6/2/25 //    Insurance Type: Payor: [employer]ICARE / Plan: [employer] ADVANTAGE / Product Type: *No Product type* /     Current Problem:   1. Weakness  Referral to Physical Therapy    Follow Up In Physical Therapy          Subjective    General:  Patient reports she has been having a lot of trouble with her right knee. She feels like the muscles are tingling and weak in her right knee/leg. She feels like her right leg weak and that she has a \"terrible gait.\" Feels like she is favoring her right knee. Has been going on for past of couple of months. She contributes all of her symptoms to polio. History of 4 LE surgeries. No falls. Would like to improve balance and walking. Does water aerobics 2x a week but feels like legs need to have more strength. Standing too long to do housework is difficult due to aching. Pain with sleeping at night.       Precautions: none  Pain: 6/10       Objective   ROM   B hip AROM: WNL  B knee AROM: WNL       MMT   B LE strength:  Hip flex 4-/5  Knee flex 4/5  Knee ext 4/5      Palpation   TTP right distal quad and tibialis anterior.   TTP B gastroc R>L    No edema    Flexibility   Moderate tightness in B gastroc      Transfers   No UE support for sit to stand     Gait   Ambulates with decreased crystal and slight lateral trunk flexion. Antalgic gait with mild unsteadiness. Decreased stance time on right      Stairs   Ascends and descends with step-to-pattern using 2 rails (uses stairs often)      Outcome Measures:  LEFS: 68/80    Treatments:  Therapeutic Exercise: Access Code [email or web address]  SLR R/L  LAQ R/L  Stand heel " raises with support  Mini squat with support       Assessment   Assessment:   Pt is a 85 y.o. 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 Pt with gait deficits, impaired balance, reduced strength, fair endurance, and flexibility restrictions. Pt will benefit from skilled PT to address the above deficits for improvement in functional activities.     Plan: Pt to continue with [employer] and HEP on her own at this time. Business card provided and she will call to schedule additional visits as needed.     Low complexity due to patient's clinical presentation being stable and uncomplicated by any significant comorbidities that may affect rehab tolerance and progression.       Plan:   Treatment/Interventions: Education/ Instruction, Gait training, Manual therapy, Neuromuscular re-education, Self care/ home management, Therapeutic activities, Therapeutic exercises  PT Plan: Skilled PT  PT Frequency: 1 time per week  Duration: 5 more visits  Number of Treatments Authorized: EVAL only  Rehab Potential: Good  Plan of Care Agreement: Patient      Goals:   Physical Therapy - Physical Therapy - July 2024 Problems       Physical Therapy - Physical Therapy - July 2024 Problems (Resolved)       PT Problem       Pt will demonstrate 4+/5 B/L LE strength  (Adequate for Discharge)       Start:  07/30/24    Resolved:  06/03/25         pt will report no greater than 2/10 pain for 3 consecutive days   (Adequate for Discharge)       Start:  07/30/24    Resolved:  06/03/25         Pt will be indep with HEP   (Adequate for Discharge)       Start:  07/30/24    Resolved:  06/03/25         Pt will demonstrate symmetrical AROM in B/L LEs  (Adequate for Discharge)       Start:  07/30/24    Resolved:  06/03/25              Physical Therapy Problems       Physical Therapy Problems (Active)       Mobility       Goal 1       Start:  06/03/25    Expected End:  09/01/25       Pt will improve B LE strength to 4+/5 to improve I/ADLs          Goal 2       Start:  06/03/25    Expected End:  09/01/25       Pt will improve B LE flexibility to WNL to improve I/ADLs            Pain       Goal 1       Start:  06/03/25    Expected End:  09/01/25       Pt will perform all hobbies ([employer], swim, etc) with 0/10 pain         Goal 2       Start:  06/03/25    Expected End:  09/01/25       Pt will stand/walk >25 min with 0/10 pain to improve I/ADLs.

## 2025-06-03 NOTE — LETTER
Carey 3, 2025    Tahir Hernandez MD  [id number]  [facility] [address]  [facility] [address]    Patient: Kena Bolaños   YOB: 1940   Date of Visit: 6/3/2025       Dear Tahir Hernandez MD  [id number]  [facility] [address]  [place] [address]    The attached plan of care is being sent to you because your patient’s medical reimbursement requires that you certify the plan of care. Your signature is required to allow uninterrupted insurance coverage.      You may indicate your approval by signing below and faxing this form back to us at Dept Fax: [phone].    Please call Dept: [phone] with any questions or concerns.    Thank you for this referral,        Cristin Pleitez PT  [facility]  [facility]  [address] [place] [address]    Payer: Payor: [employer]ICARE / Plan: [employer] MED[employer]ARE ADVANTAGE / Product Type: *No Product type* /                                                                         Date:     Dear Cristin Pleitez PT,     Re: Ms. Kena Bolaños, MRN:[id number]    I certify that I have reviewed the attached plan of care and it is medically necessary for Ms. Kena Bolaños (1940) who is under my care.          ______________________________________                    _________________  Provider name and credentials                                           Date and time                                                                                           Plan of Care 6/3/25   Effective from: 6/3/2025  Effective to: 9/1/2025    Plan ID: [id number]            Participants as of Finalize on 6/3/2025    Name Type Comments Contact Info    Tahir Hernandez MD Referring Provider  [phone]    Cristin Pleitez PT Physical Therapist  [phone]       Last Plan Note     Author: Cristin Pleitez PT Status: Incomplete Last edited: 6/3/2025  1:15 PM       Physical Therapy Evaluation and Treatment      Patient Name: Kena Bolaños  MRN:  "[id number]  Today's Date: 6/3/2025  Time Calculation  Start Time: 1310  Stop Time: 1344  Time Calculation (min): 34 min      Insurance:  Visit number: 1 of 6  Authorization info: 2025: JANIA ONLY - [facility] - AUTH REQ / $30 COPAY / $4150 OOP not met / [place] VISITS / AVAILITY [id number] / ds 6/2/25 //    Insurance Type: Payor: [employer]ICARE / Plan: [employer]ICARE ADVANTAGE / Product Type: *No Product type* /     Current Problem:   1. Weakness  Referral to Physical Therapy    Follow Up In Physical Therapy          Subjective   General:  Patient reports she has been having a lot of trouble with her right knee. She feels like the muscles are tingling and weak in her right knee/leg. She feels like her right leg weak and that she has a \"terrible gait.\" Feels like she is favoring her right knee. Has been going on for past of couple of months. She contributes all of her symptoms to polio. History of 4 LE surgeries. No falls. Would like to improve balance and walking. Does water aerobics 2x a week but feels like legs need to have more strength. Standing too long to do housework is difficult due to aching. Pain with sleeping at night.       Precautions: none  Pain: 6/10       Objective  ROM   B hip AROM: WNL  B knee AROM: WNL       MMT   B LE strength:  Hip flex 4-/5  Knee flex 4/5  Knee ext 4/5      Palpation   TTP right distal quad and tibialis anterior.   TTP B gastroc R>L    No edema    Flexibility   Moderate tightness in B gastroc      Transfers   No UE support for sit to stand     Gait   Ambulates with decreased crystal and slight lateral trunk flexion. Antalgic gait with mild unsteadiness. Decreased stance time on right      Stairs   Ascends and descends with step-to-pattern using 2 rails (uses stairs often)      Outcome Measures:  LEFS: 68/80    Treatments:  Therapeutic Exercise: Access Code [email or web address]  SLR R/L  LAQ R/L  Stand heel raises with support  Mini squat with support       Assessment  Assessment:   Pt is a 85 " y.o. 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 Pt with gait deficits, impaired balance, reduced strength, fair endurance, and flexibility restrictions. Pt will benefit from skilled PT to address the above deficits for improvement in functional activities.     Plan: Pt to continue with [employer] and [facility] on her own at this time. Business card provided and she will call to schedule additional visits as needed.     Low complexity due to patient's clinical presentation being stable and uncomplicated by any significant comorbidities that may affect rehab tolerance and progression.       Plan:   Treatment/Interventions: Education/ Instruction, Gait training, Manual therapy, Neuromuscular re-education, Self care/ home management, Therapeutic activities, Therapeutic exercises  PT Plan: Skilled PT  PT Frequency: 1 time per week  Duration: 5 more visits  Number of Treatments Authorized: EVAL only  Rehab Potential: Good  Plan of Care Agreement: Patient      Goals:   Physical Therapy - Physical Therapy - July 2024 Problems       Physical Therapy - Physical Therapy - July 2024 Problems (Resolved)       PT Problem       Pt will demonstrate 4+/5 B/L LE strength  (Adequate for Discharge)       Start:  07/30/24    Resolved:  06/03/25         pt will report no greater than 2/10 pain for 3 consecutive days   (Adequate for Discharge)       Start:  07/30/24    Resolved:  06/03/25         Pt will be indep with HEP   (Adequate for Discharge)       Start:  07/30/24    Resolved:  06/03/25         Pt will demonstrate symmetrical AROM in B/L LEs  (Adequate for Discharge)       Start:  07/30/24    Resolved:  06/03/25              Physical Therapy Problems       Physical Therapy Problems (Active)       Mobility       Goal 1       Start:  06/03/25    Expected End:  09/01/25       Pt will improve B LE strength to 4+/5 to improve I/ADLs         Goal 2       Start:  06/03/25    Expected End:  09/01/25       Pt will improve B LE  flexibility to WNL to improve I/ADLs            Pain       Goal 1       Start:  06/03/25    Expected End:  09/01/25       Pt will perform all hobbies ([employer], swim, etc) with 0/10 pain         Goal 2       Start:  06/03/25    Expected End:  09/01/25       Pt will stand/walk >25 min with 0/10 pain to improve I/ADLs.                                 Current Participants as of 6/3/2025    Name Type Comments Contact Info    Tahir Hernandez MD Referring Provider  [phone]    Signature pending    Cristin Pleitez, PT Physical Therapist  [phone]

## 2025-06-13 ENCOUNTER — OFFICE VISIT (OUTPATIENT)
Dept: RHEUMATOLOGY | Facility: CLINIC | Age: 85
End: 2025-06-13
Payer: MEDICARE

## 2025-06-13 VITALS — BODY MASS INDEX: 23.41 KG/M2 | DIASTOLIC BLOOD PRESSURE: 60 MMHG | SYSTOLIC BLOOD PRESSURE: 116 MMHG | WEIGHT: 136.4 LBS

## 2025-06-13 DIAGNOSIS — M19.90 OSTEOARTHRITIS, UNSPECIFIED OSTEOARTHRITIS TYPE, UNSPECIFIED SITE: ICD-10-CM

## 2025-06-13 DIAGNOSIS — M05.9 RHEUMATOID ARTHRITIS WITH POSITIVE RHEUMATOID FACTOR, INVOLVING UNSPECIFIED SITE (MULTI): Primary | ICD-10-CM

## 2025-06-13 DIAGNOSIS — M81.0 OSTEOPOROSIS, SENILE: ICD-10-CM

## 2025-06-13 PROCEDURE — 1159F MED LIST DOCD IN RCRD: CPT | Performed by: INTERNAL MEDICINE

## 2025-06-13 PROCEDURE — 99214 OFFICE O/P EST MOD 30 MIN: CPT | Performed by: INTERNAL MEDICINE

## 2025-06-13 PROCEDURE — 3074F SYST BP LT 130 MM HG: CPT | Performed by: INTERNAL MEDICINE

## 2025-06-13 PROCEDURE — 3078F DIAST BP <80 MM HG: CPT | Performed by: INTERNAL MEDICINE

## 2025-06-13 RX ORDER — CALCITONIN SALMON 200 [IU]/.09ML
1 SPRAY, METERED NASAL DAILY
Qty: 3.7 ML | Refills: 3 | Status: SHIPPED | OUTPATIENT
Start: 2025-06-13 | End: 2026-06-13

## 2025-06-13 RX ORDER — SEVELAMER HYDROCHLORIDE 800 MG/1
800 TABLET, FILM COATED ORAL
COMMUNITY

## 2025-06-13 NOTE — PROGRESS NOTES
"Recheck  OA  /  RA  /  OP  ( Prolia in March ) Doing well   C/O Right knee aching, burning, pinching radiating into right low leg.  Eval with Dr. Hernandez who states diagnosis if post polia syndrome.  \" There is nothing wrong with your knee \"    HPI - She has pain in her entire R leg.  She said it's not her knee, it's the leg.  It burns and tingles.  She went to PT - she had eval last wk and states he told her that he could just do the exercises at home (although per chart, recommended continuing).  No other partic pain.  L 1st CMC swelling.  AM stiffness 1 hr.  No CP, resp, or GI.    The prolia is very expensive, and she can't afford it    PE  NAD  RRR no r/m/g  CTA  No edema  No synovitis  L 1st CMC tender  Diffuse R knee/shin/calf tenderness    A/P - OA and RA, RLE pain moreso than knee pain - knee injection didn't help.  Sx likely multifactorial - postpolio, neuropathy, etc.  She declines pain mgmt  Declines thumb injection - sx not that bad  OP - can't afford prolia.  CRF so no bisphosphonates.  Calcitonin isn't as good an option, but insurance may cover better - will try.  Due for DEXA  Reviewed prev labs - CRF and anemia  No need to check labs today  Follow up 6 mo or sooner PRN      "

## 2025-06-26 ENCOUNTER — APPOINTMENT (OUTPATIENT)
Facility: CLINIC | Age: 85
End: 2025-06-26
Payer: MEDICARE

## 2025-07-07 ENCOUNTER — HOSPITAL ENCOUNTER (OUTPATIENT)
Dept: RADIOLOGY | Facility: HOSPITAL | Age: 85
Discharge: HOME | End: 2025-07-07
Payer: MEDICARE

## 2025-07-07 DIAGNOSIS — M81.0 OSTEOPOROSIS, SENILE: ICD-10-CM

## 2025-07-07 PROCEDURE — 77080 DXA BONE DENSITY AXIAL: CPT

## 2025-07-07 PROCEDURE — 77080 DXA BONE DENSITY AXIAL: CPT | Performed by: RADIOLOGY

## 2025-07-08 ENCOUNTER — TELEPHONE (OUTPATIENT)
Dept: PHYSICAL THERAPY | Facility: CLINIC | Age: 85
End: 2025-07-08
Payer: MEDICARE

## 2025-07-31 NOTE — PROGRESS NOTES
Referred by Gerardo Le DO for New Patient Visit, Shortness of Breath, and Fatigue     HPI:  Prior Cardiology - 11/2023 --> Dr Bhatti / 12/2024 --> Dr Preston    Kena Bolaños is a 85 y.o. 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 who presents to cardiology clinic to establish care.     She had previously followed with Drs     She notes that she has had some shortness of breath since she wa slast seen     ***  No exacerbating or relieving factors.  Patient denies chest pain and angina.  Pt denies orthopnea, and paroxysmal nocturnal dyspnea.  Pt denies worsening lower extremity edema.  Pt denies palpitations or syncope.  No recent falls.  No fever or chills.  No cough.  No change in bowel or bladder habits.  No sick contacts.  No recent travel.    12 point review of systems including (Constitutional, Eyes, ENMT, Respiratory, Cardiac, Gastrointestinal, Neurological, Psychiatric, and Hematologic) was performed and is otherwise negative.    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    Past surgical history reviewed:   has a past surgical history that includes Cholecystectomy  (2015);  section, classic (2015); Foot surgery (2015); CT guided imaging for needle placement (2018); Appendectomy; and Breast reconstruction (Bilateral).    Social history reviewed:   reports that she has never smoked. She has never been exposed to tobacco smoke. She has never used smokeless tobacco. She reports that she does not currently use alcohol. She reports that she does not use drugs.     Family history reviewed:  Family History[1]    Allergies reviewed: Penicillins, Sulfa (sulfonamide antibiotics), Losartan potassium, and Bee venom protein (honey bee)     Medications reviewed:   Current Outpatient Medications   Medication Instructions    acetaminophen (TylenoL) 325 mg tablet 2 tablets    amLODIPine (NORVASC) 5 mg, oral, Daily    atorvastatin (LIPITOR) 20 mg, oral, Daily    calcitonin, salmon, (Miacalcin) 200 unit/actuation nasal spray 1 spray, One Nostril, Daily    cholecalciferol, vitamin D3, (VITAMIN D3 ORAL) Take by mouth.    cyanocobalamin (Vitamin B-12) 1,000 mcg tablet 1 tablet, Daily    diclofenac epolamine 1.3 % patch 2 times daily    docusate sodium (Colace) 100 mg capsule 1 capsule, 2 times daily PRN    famotidine (PEPCID) 20 mg, oral, 2 times daily    fluticasone (Flonase) 50 mcg/actuation nasal spray 1 spray, Each Nostril, Daily, Shake gently. Before first use, prime pump. After use, clean tip and replace cap.    hydroxychloroquine (Plaquenil) 200 mg tablet oral, Daily    levothyroxine (SYNTHROID, LEVOXYL) 75 mcg, oral, Daily, Take on an empty stomach at the same time each day, either 30 to 60 minutes prior to breakfast    methyl salicylate/menth/camph (SALONPAS TOP) 2 times weekly    metoprolol tartrate (LOPRESSOR) 50 mg, oral, 2 times daily (morning and late afternoon)    nitroglycerin (Nitrostat) 0.4 mg SL tablet Place under the tongue. PLACE 1 TABLET UNDER THE TONGUE EVERY 5 MINUTES FOR UP TO 3 DOSES AS NEEDED FOR CHEST PAIN.CALL 911 IF PAIN PERSISTS.     pantoprazole (ProtoNix) 40 mg EC tablet 1 tablet, Daily    sevelamer HCl (RENAGEL) 800 mg, 3 times daily (morning, midday, late afternoon)    sodium bicarbonate 650 mg, oral, 2 times daily    tetrahydroz/dext 70/peg 400/pv (EYE DROPS ADVANCED RELIEF OPHT) Eye Drops Relief        Vitals reviewed: Visit Vitals  /75 (BP Location: Right arm, Patient Position: Sitting, BP Cuff Size: Adult)   Pulse 67   Resp 18       Physical Exam:     General:  Patient is awake, alert, and oriented.  Patient is in no acute distress.  HEENT:  Pupils equal and reactive.  Normocephalic.  Moist mucosa.    Neck:  No thyromegaly.  Normal Jugular Venous Pressure.  Cardiovascular:  Regular rate and rhythm.  Normal S1 and S2.  1/6 PROMISE.  Pulmonary:  Clear to auscultation bilaterally.  Abdomen:  Soft. Non-tender.   Non-distended.  Positive bowel sounds.  Lower Extremities:  2+ pedal pulses. No LE edema.  Neurologic:  Cranial nerves intact.  No focal deficit.   Skin: Skin warm and dry, normal skin turgor.   Psychiatric: Normal affect.    Last Labs:  CBC -      Lab Results   Component Value Date    WBC 8.3 02/12/2025    HGB 10.2 (L) 02/12/2025    HCT 29.6 (L) 02/12/2025     02/12/2025        CMP-  Lab Results   Component Value Date    GLUCOSE 89 03/13/2025     03/13/2025    K 4.5 03/13/2025     03/13/2025    CO2 25 03/13/2025    ANIONGAP 11 03/13/2025    BUN 29 (H) 03/13/2025    CREATININE 2.08 (H) 03/13/2025    EGFR 23 (L) 03/13/2025    CALCIUM 9.4 03/13/2025    PHOS 5.2 (H) 11/18/2024    PROT 6.9 03/13/2025    ALBUMIN 4.4 03/13/2025    AST 16 03/13/2025    ALT 9 03/13/2025    ALKPHOS 45 03/13/2025    BILITOT 0.6 03/13/2025        LIPIDS-  Lab Results   Component Value Date    CHOL 136 12/23/2024    TRIG 99 12/23/2024    HDL 63.5 12/23/2024    CHHDL 2.1 12/23/2024    VLDL 20 12/23/2024        OTHERS-  Lab Results   Component Value Date    HGBA1C 5.3 05/06/2025        I personally reviewed the patient's recent vitals, labs,  medications, orders, EKGs, pertinent cardiac imaging/ echocardiography and ischemic evaluations including stress testing/ cardiac catheterization.    Assessment and Plan:  Problem List Items Addressed This Visit       Atherosclerotic heart disease of native coronary artery without angina pectoris    Overview   Echo EF 50-55 Apical hypokinesis  12/23./         Relevant Orders    Magnesium    Transthoracic echo (TTE) complete    Renal function panel    Dyslipidemia    Relevant Orders    Lipid Panel    Lipoprotein a    Magnesium    Renal function panel    Essential hypertension - Primary    Overview   7/31/2025 There were no vitals taken for this visit.           Relevant Orders    ECG 12 lead (Clinic Performed)    B-Type Natriuretic Peptide    Magnesium    Transthoracic echo (TTE) complete    Renal function panel    History of coronary artery stent placement    Overview   Reported JENNIFER X 2 in LAD in 2016         Relevant Orders    Lipid Panel    Lipoprotein a    Magnesium    Transthoracic echo (TTE) complete    Renal function panel    Hyperlipidemia    Relevant Orders    Magnesium    Renal function panel     Other Visit Diagnoses         Mild shortness of breath        Relevant Orders    B-Type Natriuretic Peptide              Please followup with me in Cardiology clinic within the next ***.  Please return to clinic sooner or seek emergent care if your symptoms reoccur or worsen.    Thank you for allowing me to participate in their care.  Please feel free to call me with any further questions or concerns.        Gerardo Le DO   Division of Cardiovascular Medicine  [facility]                [1]   Family History  Problem Relation Name Age of Onset    Diabetes Mother      Colon cancer Mother      Diabetes Maternal Grandmother      Diabetes Maternal Grandfather      Pancreatic cancer Sibling        Pancreatic cancer Sibling

## 2025-08-01 ENCOUNTER — OFFICE VISIT (OUTPATIENT)
Dept: CARDIOLOGY | Facility: CLINIC | Age: 85
End: 2025-08-01
Payer: MEDICARE

## 2025-08-01 VITALS
HEART RATE: 67 BPM | SYSTOLIC BLOOD PRESSURE: 127 MMHG | HEIGHT: 63 IN | WEIGHT: 134 LBS | BODY MASS INDEX: 23.74 KG/M2 | OXYGEN SATURATION: 99 % | DIASTOLIC BLOOD PRESSURE: 75 MMHG | RESPIRATION RATE: 18 BRPM

## 2025-08-01 DIAGNOSIS — E78.01 FAMILIAL HYPERCHOLESTEROLEMIA: ICD-10-CM

## 2025-08-01 DIAGNOSIS — I25.10 ATHEROSCLEROSIS OF NATIVE CORONARY ARTERY OF NATIVE HEART WITHOUT ANGINA PECTORIS: ICD-10-CM

## 2025-08-01 DIAGNOSIS — E78.5 DYSLIPIDEMIA: ICD-10-CM

## 2025-08-01 DIAGNOSIS — R06.02 MILD SHORTNESS OF BREATH: ICD-10-CM

## 2025-08-01 DIAGNOSIS — Z95.5 HISTORY OF CORONARY ARTERY STENT PLACEMENT: ICD-10-CM

## 2025-08-01 DIAGNOSIS — I10 ESSENTIAL HYPERTENSION: Primary | ICD-10-CM

## 2025-08-01 LAB
ATRIAL RATE: 67 BPM
P AXIS: 29 DEGREES
P OFFSET: 200 MS
P ONSET: 141 MS
PR INTERVAL: 154 MS
Q ONSET: 218 MS
QRS COUNT: 11 BEATS
QRS DURATION: 88 MS
QT INTERVAL: 428 MS
QTC CALCULATION(BAZETT): 452 MS
QTC FREDERICIA: 444 MS
R AXIS: 42 DEGREES
T AXIS: 23 DEGREES
T OFFSET: 432 MS
VENTRICULAR RATE: 67 BPM

## 2025-08-01 PROCEDURE — G2211 COMPLEX E/M VISIT ADD ON: HCPCS | Performed by: INTERNAL MEDICINE

## 2025-08-01 PROCEDURE — 93010 ELECTROCARDIOGRAM REPORT: CPT | Performed by: INTERNAL MEDICINE

## 2025-08-01 PROCEDURE — 99202 OFFICE O/P NEW SF 15 MIN: CPT

## 2025-08-01 PROCEDURE — 99214 OFFICE O/P EST MOD 30 MIN: CPT | Performed by: INTERNAL MEDICINE

## 2025-08-01 PROCEDURE — 3074F SYST BP LT 130 MM HG: CPT | Performed by: INTERNAL MEDICINE

## 2025-08-01 PROCEDURE — 3078F DIAST BP <80 MM HG: CPT | Performed by: INTERNAL MEDICINE

## 2025-08-01 PROCEDURE — 99202 OFFICE O/P NEW SF 15 MIN: CPT | Mod: 25

## 2025-08-01 PROCEDURE — 1160F RVW MEDS BY RX/DR IN RCRD: CPT | Performed by: INTERNAL MEDICINE

## 2025-08-01 PROCEDURE — 93005 ELECTROCARDIOGRAM TRACING: CPT | Performed by: INTERNAL MEDICINE

## 2025-08-01 PROCEDURE — 1159F MED LIST DOCD IN RCRD: CPT | Performed by: INTERNAL MEDICINE

## 2025-08-01 ASSESSMENT — COLUMBIA-SUICIDE SEVERITY RATING SCALE - C-SSRS
2. HAVE YOU ACTUALLY HAD ANY THOUGHTS OF KILLING YOURSELF?: NO
1. IN THE PAST MONTH, HAVE YOU WISHED YOU WERE DEAD OR WISHED YOU COULD GO TO SLEEP AND NOT WAKE UP?: NO
6. HAVE YOU EVER DONE ANYTHING, STARTED TO DO ANYTHING, OR PREPARED TO DO ANYTHING TO END YOUR LIFE?: NO

## 2025-08-01 ASSESSMENT — ENCOUNTER SYMPTOMS: DEPRESSION: 0

## 2025-08-04 ENCOUNTER — OFFICE VISIT (OUTPATIENT)
Dept: PRIMARY CARE | Facility: CLINIC | Age: 85
End: 2025-08-04
Payer: MEDICARE

## 2025-08-04 VITALS
TEMPERATURE: 97.5 F | WEIGHT: 135 LBS | DIASTOLIC BLOOD PRESSURE: 58 MMHG | BODY MASS INDEX: 23.91 KG/M2 | SYSTOLIC BLOOD PRESSURE: 108 MMHG | OXYGEN SATURATION: 98 % | HEART RATE: 68 BPM

## 2025-08-04 DIAGNOSIS — Z12.31 SCREENING MAMMOGRAM FOR BREAST CANCER: ICD-10-CM

## 2025-08-04 DIAGNOSIS — I10 ESSENTIAL HYPERTENSION: Primary | ICD-10-CM

## 2025-08-04 DIAGNOSIS — E03.9 ACQUIRED HYPOTHYROIDISM: ICD-10-CM

## 2025-08-04 DIAGNOSIS — G14 POST-POLIO SYNDROME: ICD-10-CM

## 2025-08-04 DIAGNOSIS — D64.9 ANEMIA, UNSPECIFIED TYPE: ICD-10-CM

## 2025-08-04 PROCEDURE — G2211 COMPLEX E/M VISIT ADD ON: HCPCS | Performed by: INTERNAL MEDICINE

## 2025-08-04 PROCEDURE — 99214 OFFICE O/P EST MOD 30 MIN: CPT | Performed by: INTERNAL MEDICINE

## 2025-08-04 PROCEDURE — 3074F SYST BP LT 130 MM HG: CPT | Performed by: INTERNAL MEDICINE

## 2025-08-04 PROCEDURE — 1126F AMNT PAIN NOTED NONE PRSNT: CPT | Performed by: INTERNAL MEDICINE

## 2025-08-04 PROCEDURE — 3078F DIAST BP <80 MM HG: CPT | Performed by: INTERNAL MEDICINE

## 2025-08-04 PROCEDURE — 1159F MED LIST DOCD IN RCRD: CPT | Performed by: INTERNAL MEDICINE

## 2025-08-04 ASSESSMENT — ENCOUNTER SYMPTOMS
OCCASIONAL FEELINGS OF UNSTEADINESS: 0
NUMBNESS: 0
BLOOD IN STOOL: 0
WHEEZING: 0
BACK PAIN: 0
UNEXPECTED WEIGHT CHANGE: 0
DEPRESSION: 0
POLYPHAGIA: 0
ABDOMINAL PAIN: 0
COUGH: 0
MYALGIAS: 0
DYSURIA: 0
TREMORS: 0
CHILLS: 0
SEIZURES: 0
VOMITING: 0
ARTHRALGIAS: 1
NAUSEA: 0
TROUBLE SWALLOWING: 0
LOSS OF SENSATION IN FEET: 0
PALPITATIONS: 0
SHORTNESS OF BREATH: 0
FREQUENCY: 0
SINUS PRESSURE: 0
POLYDIPSIA: 0
FATIGUE: 0

## 2025-08-04 ASSESSMENT — PATIENT HEALTH QUESTIONNAIRE - PHQ9
2. FEELING DOWN, DEPRESSED OR HOPELESS: NOT AT ALL
SUM OF ALL RESPONSES TO PHQ9 QUESTIONS 1 AND 2: 0
1. LITTLE INTEREST OR PLEASURE IN DOING THINGS: NOT AT ALL

## 2025-08-04 ASSESSMENT — PAIN SCALES - GENERAL: PAINLEVEL_OUTOF10: 0-NO PAIN

## 2025-08-04 NOTE — PROGRESS NOTES
Subjective   Patient ID: Kena Bolaños is a 85 y.o. female who presents for Follow-up (6mon follow up), Fatigue, and Insomnia.    HPI     Has history of rheumatoid arthritis, CKD stage IV, anemia, coronary artery disease, hypertension, hypothyroidism, postpolio syndrome  History of rheumatoid arthritis -seeing Dr. Ramos  H/O osteoporosis, being managed by Dr Ramos-  on Prolia injections  History of CKD, follows with Dr. Talbot  H/O CAD- seeing Dr Lawrence's  Very active, does water aerobics, 2-3 times a week  Post polio syndrome, had multiple surgeries on right ankle, wore a brace on right leg till she turned 5  Has occasional pain in right ankle    Takes since last few months, wakes up multiple times at night  Takes melatonin once in a while    Review of Systems   Constitutional:  Negative for chills, fatigue and unexpected weight change.   HENT:  Negative for postnasal drip, sinus pressure and trouble swallowing.    Respiratory:  Negative for cough, shortness of breath and wheezing.    Cardiovascular:  Negative for chest pain, palpitations and leg swelling.   Gastrointestinal:  Negative for abdominal pain, blood in stool, nausea and vomiting.   Endocrine: Negative for polydipsia, polyphagia and polyuria.   Genitourinary:  Negative for dysuria and frequency.   Musculoskeletal:  Positive for arthralgias. Negative for back pain and myalgias.   Skin:  Negative for rash.   Neurological:  Negative for tremors, seizures and numbness.   Psychiatric/Behavioral:  Negative for behavioral problems.        Objective   /58 (BP Location: Left arm, Patient Position: Sitting, BP Cuff Size: Adult)   Pulse 68   Temp 36.4 °C (97.5 °F) (Temporal)   Wt 61.2 kg (135 lb)   SpO2 98%   BMI 23.91 kg/m²     Physical Exam  Constitutional:       General: She is not in acute distress.     Appearance: Normal appearance.   HENT:      Head: Normocephalic and atraumatic.     Eyes:      Extraocular Movements: Extraocular movements intact.       Pupils: Pupils are equal, round, and reactive to light.       Cardiovascular:      Rate and Rhythm: Normal rate and regular rhythm.      Heart sounds: Normal heart sounds. No murmur heard.     No friction rub.   Pulmonary:      Effort: Pulmonary effort is normal.      Breath sounds: Normal breath sounds. No wheezing or rales.   Abdominal:      General: Bowel sounds are normal.      Palpations: Abdomen is soft.      Tenderness: There is no abdominal tenderness. There is no guarding.     Musculoskeletal:         General: Normal range of motion.      Cervical back: Normal range of motion and neck supple.      Right lower leg: No edema.      Left lower leg: No edema.   Lymphadenopathy:      Cervical: No cervical adenopathy.     Skin:     General: Skin is warm and dry.      Findings: No rash.     Neurological:      General: No focal deficit present.      Mental Status: She is alert and oriented to person, place, and time.      Cranial Nerves: No cranial nerve deficit.     Psychiatric:         Mood and Affect: Mood normal.         Assessment/Plan        Kena was seen today for follow-up, fatigue and insomnia.  Diagnoses and all orders for this visit:  Essential hypertension (Primary)  Acquired hypothyroidism  -     TSH with reflex to Free T4 if abnormal; Future  -     TSH with reflex to Free T4 if abnormal  Anemia, unspecified type  -     CBC and Auto Differential; Future  -     Iron and TIBC; Future  -     Vitamin B12; Future  -     CBC and Auto Differential  -     Iron and TIBC  -     Vitamin B12  Screening mammogram for breast cancer  -     BI mammo bilateral screening tomosynthesis; Future        Take melatonin at night  Advised weightbearing exercises couple of times a week    Lab Results   Component Value Date    WBC 8.3 02/12/2025    HGB 10.2 (L) 02/12/2025    HCT 29.6 (L) 02/12/2025    MCV 85 02/12/2025     02/12/2025     Lab Results   Component Value Date    GLUCOSE 89 03/13/2025    CALCIUM 9.4  03/13/2025     03/13/2025    K 4.5 03/13/2025    CO2 25 03/13/2025     03/13/2025    BUN 29 (H) 03/13/2025    CREATININE 2.08 (H) 03/13/2025       Current Outpatient Medications   Medication Instructions    acetaminophen (TylenoL) 325 mg tablet 2 tablets    amLODIPine (NORVASC) 5 mg, oral, Daily    atorvastatin (LIPITOR) 20 mg, oral, Daily    calcitonin, salmon, (Miacalcin) 200 unit/actuation nasal spray 1 spray, One Nostril, Daily    cholecalciferol, vitamin D3, (VITAMIN D3 ORAL) Take by mouth.    cyanocobalamin (Vitamin B-12) 1,000 mcg tablet 1 tablet, Daily    diclofenac epolamine 1.3 % patch 2 times daily    docusate sodium (Colace) 100 mg capsule 1 capsule, 2 times daily PRN    famotidine (PEPCID) 20 mg, oral, 2 times daily    fluticasone (Flonase) 50 mcg/actuation nasal spray 1 spray, Each Nostril, Daily, Shake gently. Before first use, prime pump. After use, clean tip and replace cap.    hydroxychloroquine (Plaquenil) 200 mg tablet oral, Daily    levothyroxine (SYNTHROID, LEVOXYL) 75 mcg, oral, Daily, Take on an empty stomach at the same time each day, either 30 to 60 minutes prior to breakfast    methyl salicylate/menth/camph (SALONPAS TOP) 2 times weekly    metoprolol tartrate (LOPRESSOR) 50 mg, oral, 2 times daily (morning and late afternoon)    nitroglycerin (Nitrostat) 0.4 mg SL tablet Place under the tongue. PLACE 1 TABLET UNDER THE TONGUE EVERY 5 MINUTES FOR UP TO 3 DOSES AS NEEDED FOR CHEST PAIN.CALL 911 IF PAIN PERSISTS.    pantoprazole (ProtoNix) 40 mg EC tablet 1 tablet, Daily    sevelamer HCl (RENAGEL) 800 mg, 3 times daily (morning, midday, late afternoon)    sodium bicarbonate 650 mg, oral, 2 times daily    tetrahydroz/dext 70/peg 400/pv (EYE DROPS ADVANCED RELIEF OPHT) Eye Drops Relief

## 2025-08-11 ENCOUNTER — APPOINTMENT (OUTPATIENT)
Dept: RHEUMATOLOGY | Facility: CLINIC | Age: 85
End: 2025-08-11
Payer: MEDICARE

## 2025-08-11 ENCOUNTER — APPOINTMENT (OUTPATIENT)
Dept: PRIMARY CARE | Facility: CLINIC | Age: 85
End: 2025-08-11
Payer: MEDICARE

## 2025-08-18 ENCOUNTER — TELEPHONE (OUTPATIENT)
Dept: PRIMARY CARE | Facility: CLINIC | Age: 85
End: 2025-08-18
Payer: MEDICARE

## 2025-08-18 DIAGNOSIS — G62.9 NEUROPATHY: ICD-10-CM

## 2025-08-19 LAB
BASOPHILS # BLD AUTO: 42 CELLS/UL (ref 0–200)
BASOPHILS NFR BLD AUTO: 0.7 %
EOSINOPHIL # BLD AUTO: 150 CELLS/UL (ref 15–500)
EOSINOPHIL NFR BLD AUTO: 2.5 %
ERYTHROCYTE [DISTWIDTH] IN BLOOD BY AUTOMATED COUNT: 12.7 % (ref 11–15)
HCT VFR BLD AUTO: 31.4 % (ref 35–45)
HGB BLD-MCNC: 10.2 G/DL (ref 11.7–15.5)
IRON SATN MFR SERPL: 19 % (CALC) (ref 16–45)
IRON SERPL-MCNC: 59 MCG/DL (ref 45–160)
LYMPHOCYTES # BLD AUTO: 2244 CELLS/UL (ref 850–3900)
LYMPHOCYTES NFR BLD AUTO: 37.4 %
MCH RBC QN AUTO: 29.2 PG (ref 27–33)
MCHC RBC AUTO-ENTMCNC: 32.5 G/DL (ref 32–36)
MCV RBC AUTO: 90 FL (ref 80–100)
MONOCYTES # BLD AUTO: 600 CELLS/UL (ref 200–950)
MONOCYTES NFR BLD AUTO: 10 %
NEUTROPHILS # BLD AUTO: 2964 CELLS/UL (ref 1500–7800)
NEUTROPHILS NFR BLD AUTO: 49.4 %
PLATELET # BLD AUTO: 268 THOUSAND/UL (ref 140–400)
PMV BLD REES-ECKER: 10.2 FL (ref 7.5–12.5)
RBC # BLD AUTO: 3.49 MILLION/UL (ref 3.8–5.1)
TIBC SERPL-MCNC: 309 MCG/DL (CALC) (ref 250–450)
TSH SERPL-ACNC: 1.02 MIU/L (ref 0.4–4.5)
VIT B12 SERPL-MCNC: >2000 PG/ML (ref 200–1100)
WBC # BLD AUTO: 6 THOUSAND/UL (ref 3.8–10.8)

## 2025-08-27 DIAGNOSIS — M05.9 RHEUMATOID ARTHRITIS WITH POSITIVE RHEUMATOID FACTOR, INVOLVING UNSPECIFIED SITE (MULTI): ICD-10-CM

## 2025-08-27 RX ORDER — HYDROXYCHLOROQUINE SULFATE 200 MG/1
TABLET, FILM COATED ORAL DAILY
Qty: 90 TABLET | Refills: 1 | Status: SHIPPED | OUTPATIENT
Start: 2025-08-27

## 2025-09-29 ENCOUNTER — APPOINTMENT (OUTPATIENT)
Dept: INFUSION THERAPY | Facility: CLINIC | Age: 85
End: 2025-09-29
Payer: MEDICARE

## 2026-01-05 ENCOUNTER — APPOINTMENT (OUTPATIENT)
Dept: NEUROLOGY | Facility: CLINIC | Age: 86
End: 2026-01-05
Payer: MEDICARE

## 2026-02-06 ENCOUNTER — APPOINTMENT (OUTPATIENT)
Dept: CARDIOLOGY | Facility: CLINIC | Age: 86
End: 2026-02-06
Payer: MEDICARE